# Patient Record
Sex: FEMALE | Race: WHITE | Employment: UNEMPLOYED | URBAN - METROPOLITAN AREA
[De-identification: names, ages, dates, MRNs, and addresses within clinical notes are randomized per-mention and may not be internally consistent; named-entity substitution may affect disease eponyms.]

---

## 2017-01-08 ENCOUNTER — OFFICE VISIT (OUTPATIENT)
Dept: FAMILY MEDICINE CLINIC | Facility: CLINIC | Age: 52
End: 2017-01-08

## 2017-01-08 VITALS
TEMPERATURE: 98 F | HEART RATE: 94 BPM | RESPIRATION RATE: 16 BRPM | SYSTOLIC BLOOD PRESSURE: 128 MMHG | BODY MASS INDEX: 25 KG/M2 | WEIGHT: 148 LBS | DIASTOLIC BLOOD PRESSURE: 72 MMHG | OXYGEN SATURATION: 97 %

## 2017-01-08 DIAGNOSIS — J01.10 ACUTE NON-RECURRENT FRONTAL SINUSITIS: Primary | ICD-10-CM

## 2017-01-08 PROCEDURE — 99213 OFFICE O/P EST LOW 20 MIN: CPT | Performed by: NURSE PRACTITIONER

## 2017-01-08 RX ORDER — PREDNISONE 20 MG/1
20 TABLET ORAL DAILY
Qty: 5 TABLET | Refills: 0 | Status: SHIPPED | OUTPATIENT
Start: 2017-01-08 | End: 2017-01-13

## 2017-01-08 RX ORDER — CEFDINIR 300 MG/1
300 CAPSULE ORAL 2 TIMES DAILY
Qty: 20 CAPSULE | Refills: 0 | Status: SHIPPED | OUTPATIENT
Start: 2017-01-08 | End: 2017-01-20

## 2017-01-08 NOTE — PROGRESS NOTES
CHIEF COMPLAINT:   Patient presents with:  Sinus Problem: for 2 weeks, s.s worsen on weds.  pt came back from [place] last night otc: otcs  Ear Pain: noth ears pain mostly right ear. headache and ear pressure, fever since thrus 100-99 otc: otcs      HPI:   Gi • Heart Disease Maternal Grandfather    • Stroke Neg    • Cancer Paternal Uncle    • Heart Disease Maternal Aunt         Smoking Status: Former Smoker                   Packs/Day: 0.00  Years:           Types: Cigarettes    Alcohol Use: Yes           0.5 o Educated on using supportive medication as needed  Educated on taking a probiotic or eating a yogurt daily while taking medication  Educated on s/s of worsening sx and when to seek higher level of care  Follow up with pcp if not improving  Meds & Refills f · Over-the-counter decongestants may be used unless a similar medicine was prescribed. Nasal sprays work the fastest. Use one that contains phenylephrine or oxymetazoline. First blow the nose gently. Then use the spray.  Do not use these medicines more ofte © 2476-6567 [address], [address] [place]. All rights reserved. This information is not intended as a substitute for professional medical care. Always follow your healthcare professional's instructions.               Kimberley Fleming

## 2017-01-20 ENCOUNTER — OFFICE VISIT (OUTPATIENT)
Dept: INTERNAL MEDICINE CLINIC | Facility: CLINIC | Age: 52
End: 2017-01-20

## 2017-01-20 VITALS
SYSTOLIC BLOOD PRESSURE: 124 MMHG | RESPIRATION RATE: 16 BRPM | HEIGHT: 65 IN | HEART RATE: 92 BPM | DIASTOLIC BLOOD PRESSURE: 82 MMHG | TEMPERATURE: 99 F | BODY MASS INDEX: 23.66 KG/M2 | WEIGHT: 142 LBS

## 2017-01-20 DIAGNOSIS — J01.10 ACUTE NON-RECURRENT FRONTAL SINUSITIS: Primary | ICD-10-CM

## 2017-01-20 PROCEDURE — 99213 OFFICE O/P EST LOW 20 MIN: CPT | Performed by: INTERNAL MEDICINE

## 2017-01-20 RX ORDER — CEFDINIR 300 MG/1
300 CAPSULE ORAL 2 TIMES DAILY
Qty: 20 CAPSULE | Refills: 0 | Status: SHIPPED | OUTPATIENT
Start: 2017-01-20 | End: 2017-01-30

## 2017-01-20 RX ORDER — CODEINE PHOSPHATE AND GUAIFENESIN 10; 100 MG/5ML; MG/5ML
10 SOLUTION ORAL EVERY 6 HOURS PRN
Qty: 180 ML | Refills: 0 | Status: SHIPPED | OUTPATIENT
Start: 2017-01-20 | End: 2017-01-30

## 2017-01-20 NOTE — PROGRESS NOTES
Shaniqua Trejo is a 45year old female. Patient presents with:  Sinus Problem: headache, nausea, and vomiting going on for 3 days . Pt just finished antibiotic for sinus infection.       HPI:     1/8 went to [facility] for sinus infection, just finished ABX but not hyperlipidemia[other] [OTHER] Mother    • Heart Disease Mother    • Heart Disease Maternal Grandfather    • Stroke Neg    • Cancer Paternal Uncle    • Heart Disease Maternal Aunt         Allergies    Pollen                    Allergy                     Co

## 2017-01-23 ENCOUNTER — TELEPHONE (OUTPATIENT)
Dept: INTERNAL MEDICINE CLINIC | Facility: CLINIC | Age: 52
End: 2017-01-23

## 2017-01-23 RX ORDER — FLUCONAZOLE 150 MG/1
150 TABLET ORAL ONCE
Qty: 1 TABLET | Refills: 0 | Status: SHIPPED | OUTPATIENT
Start: 2017-01-23 | End: 2017-01-23

## 2017-01-23 NOTE — TELEPHONE ENCOUNTER
Pt states she saw TB on 1/20/17, started Cefdinir on Friday, Saturday noticed she was starting yeast infection s/s, yellow discharge, itching, raw, painful. Last night, pt started a 3 day dosing of Monistat otc which helped her sleep.   Pt states her sinuse

## 2017-01-23 NOTE — TELEPHONE ENCOUNTER
.Called pt to advise info per TB that diflucan was sent in . Pt verbalized understanding, agreed with POC and had  no further questions.

## 2017-01-25 ENCOUNTER — TELEPHONE (OUTPATIENT)
Dept: INTERNAL MEDICINE CLINIC | Facility: CLINIC | Age: 52
End: 2017-01-25

## 2017-01-25 DIAGNOSIS — M79.10 MYALGIA: ICD-10-CM

## 2017-01-25 DIAGNOSIS — R68.83 CHILLS: Primary | ICD-10-CM

## 2017-01-25 RX ORDER — FLUCONAZOLE 150 MG/1
TABLET ORAL
Qty: 2 TABLET | Refills: 0 | Status: SHIPPED | OUTPATIENT
Start: 2017-01-25 | End: 2017-09-22 | Stop reason: ALTCHOICE

## 2017-01-25 NOTE — TELEPHONE ENCOUNTER
LOV 1/20/17  Pt stating sx worsening- pain when going to [facility]. Burning upon urination. Itching. Yellow D/C. Diflucan helped for about 24hrs. New sx of fatigue, sore throat, body aches and chills. No fever at this time. Please advise. Thank you.

## 2017-01-26 ENCOUNTER — LAB ENCOUNTER (OUTPATIENT)
Dept: LAB | Facility: HOSPITAL | Age: 52
End: 2017-01-26
Attending: INTERNAL MEDICINE
Payer: COMMERCIAL

## 2017-01-26 DIAGNOSIS — M79.10 MYALGIA: ICD-10-CM

## 2017-01-26 DIAGNOSIS — R68.83 CHILLS: ICD-10-CM

## 2017-01-26 PROCEDURE — 87999 UNLISTED MICROBIOLOGY PX: CPT

## 2017-01-26 PROCEDURE — 87798 DETECT AGENT NOS DNA AMP: CPT

## 2017-01-26 PROCEDURE — 87502 INFLUENZA DNA AMP PROBE: CPT

## 2017-01-26 NOTE — TELEPHONE ENCOUNTER
Patient notified TB would like her to have an influenza nasal swab to determine if she has the flu, order in [facility].   Pt notified for yeast take Diflucan 150mg now and in 72 hours, escribed to local pharmacy and at the same time pt can use otc Mon

## 2017-01-26 NOTE — TELEPHONE ENCOUNTER
Wondering if she is now getting the flu with aches, chills, sore throat  Can have her to flu swab at the hospital- I will order  For yeast, let us try diflucan 150mg times one followed by another 150mg in 72 hours.  At same time, she can apply  otc monistat

## 2017-01-27 NOTE — TELEPHONE ENCOUNTER
Lm for pt to inform, per TB, OV next week would be good. To call back at the office to schedule or if any further questions.

## 2017-01-27 NOTE — TELEPHONE ENCOUNTER
Pt notified Infuenza nasal swab is negative. Pt states she has head and nasal congestion with yellow mucus, PND, chills, body aches, still taking Cefdinir but doesn't think the med is working, also taking Tylenol Sinus without success.   Pt states she joaquin

## 2017-09-22 ENCOUNTER — OFFICE VISIT (OUTPATIENT)
Dept: INTERNAL MEDICINE CLINIC | Facility: CLINIC | Age: 52
End: 2017-09-22

## 2017-09-22 ENCOUNTER — LAB ENCOUNTER (OUTPATIENT)
Dept: LAB | Age: 52
End: 2017-09-22
Attending: INTERNAL MEDICINE
Payer: COMMERCIAL

## 2017-09-22 VITALS
DIASTOLIC BLOOD PRESSURE: 72 MMHG | RESPIRATION RATE: 16 BRPM | WEIGHT: 142 LBS | HEART RATE: 88 BPM | TEMPERATURE: 99 F | HEIGHT: 65 IN | BODY MASS INDEX: 23.66 KG/M2 | SYSTOLIC BLOOD PRESSURE: 124 MMHG

## 2017-09-22 DIAGNOSIS — Z91.09 ENVIRONMENTAL ALLERGIES: ICD-10-CM

## 2017-09-22 DIAGNOSIS — M22.41 PATELLOFEMORAL CHONDROSIS OF RIGHT KNEE: ICD-10-CM

## 2017-09-22 DIAGNOSIS — Z01.818 PRE-OP EXAMINATION: Primary | ICD-10-CM

## 2017-09-22 DIAGNOSIS — Z01.818 PRE-OP EXAMINATION: ICD-10-CM

## 2017-09-22 LAB
BASOPHILS # BLD AUTO: 0.06 X10(3) UL (ref 0–0.1)
BASOPHILS NFR BLD AUTO: 0.8 %
BUN BLD-MCNC: 11 MG/DL (ref 8–20)
CALCIUM BLD-MCNC: 8.9 MG/DL (ref 8.3–10.3)
CHLORIDE: 105 MMOL/L (ref 101–111)
CO2: 28 MMOL/L (ref 22–32)
CREAT BLD-MCNC: 0.85 MG/DL (ref 0.55–1.02)
EOSINOPHIL # BLD AUTO: 0.22 X10(3) UL (ref 0–0.3)
EOSINOPHIL NFR BLD AUTO: 2.8 %
ERYTHROCYTE [DISTWIDTH] IN BLOOD BY AUTOMATED COUNT: 13.2 % (ref 11.5–16)
GLUCOSE BLD-MCNC: 86 MG/DL (ref 70–99)
HCT VFR BLD AUTO: 39.4 % (ref 34–50)
HGB BLD-MCNC: 13.1 G/DL (ref 12–16)
IMMATURE GRANULOCYTE COUNT: 0.02 X10(3) UL (ref 0–1)
IMMATURE GRANULOCYTE RATIO %: 0.3 %
LYMPHOCYTES # BLD AUTO: 2.12 X10(3) UL (ref 0.9–4)
LYMPHOCYTES NFR BLD AUTO: 26.9 %
MCH RBC QN AUTO: 31.4 PG (ref 27–33.2)
MCHC RBC AUTO-ENTMCNC: 33.2 G/DL (ref 31–37)
MCV RBC AUTO: 94.5 FL (ref 81–100)
MONOCYTES # BLD AUTO: 0.76 X10(3) UL (ref 0.1–0.6)
MONOCYTES NFR BLD AUTO: 9.6 %
NEUTROPHIL ABS PRELIM: 4.7 X10 (3) UL (ref 1.3–6.7)
NEUTROPHILS # BLD AUTO: 4.7 X10(3) UL (ref 1.3–6.7)
NEUTROPHILS NFR BLD AUTO: 59.6 %
PLATELET # BLD AUTO: 322 10(3)UL (ref 150–450)
POTASSIUM SERPL-SCNC: 4.5 MMOL/L (ref 3.6–5.1)
RBC # BLD AUTO: 4.17 X10(6)UL (ref 3.8–5.1)
RED CELL DISTRIBUTION WIDTH-SD: 45.6 FL (ref 35.1–46.3)
SODIUM SERPL-SCNC: 139 MMOL/L (ref 136–144)
WBC # BLD AUTO: 7.9 X10(3) UL (ref 4–13)

## 2017-09-22 PROCEDURE — 85025 COMPLETE CBC W/AUTO DIFF WBC: CPT | Performed by: INTERNAL MEDICINE

## 2017-09-22 PROCEDURE — 80048 BASIC METABOLIC PNL TOTAL CA: CPT | Performed by: INTERNAL MEDICINE

## 2017-09-22 PROCEDURE — 99243 OFF/OP CNSLTJ NEW/EST LOW 30: CPT | Performed by: INTERNAL MEDICINE

## 2017-09-22 PROCEDURE — 93000 ELECTROCARDIOGRAM COMPLETE: CPT | Performed by: INTERNAL MEDICINE

## 2017-09-22 NOTE — PROGRESS NOTES
Severiano Foyer is a 47year old female.   Patient presents with:  Pre-Op Exam: For R knee surgery      HPI:     Patient with history of right knee chondrosis, environmental allergies,  history of migraines here for pre op exam for right knee surgery on 9/26/1 Comment:Environmental      REVIEW OF SYSTEMS:   GENERAL HEALTH:  no fevers or chills  SKIN: denies any unusual skin lesions or rashes  RESPIRATORY: no cough  CARDIOVASCULAR: denies chest pain   GI: denies abdominal pain   : no dysuria  NEURO: denies

## 2017-09-25 ENCOUNTER — TELEPHONE (OUTPATIENT)
Dept: INTERNAL MEDICINE CLINIC | Facility: CLINIC | Age: 52
End: 2017-09-25

## 2017-09-25 NOTE — TELEPHONE ENCOUNTER
Faxed Preoperative paperwork and lab results to Rory Gentile at [address] as requested. Sent original EKG to scan, copy kept in triage file. Confirmation received.

## 2017-09-25 NOTE — TELEPHONE ENCOUNTER
Pt was seen 9/22/17 by TB stated they have not received any of the pre op paperwork. Please fax to her attention.

## 2017-12-29 ENCOUNTER — OFFICE VISIT (OUTPATIENT)
Dept: INTERNAL MEDICINE CLINIC | Facility: CLINIC | Age: 52
End: 2017-12-29

## 2017-12-29 VITALS
HEART RATE: 88 BPM | HEIGHT: 65 IN | RESPIRATION RATE: 16 BRPM | TEMPERATURE: 99 F | BODY MASS INDEX: 25.16 KG/M2 | DIASTOLIC BLOOD PRESSURE: 80 MMHG | SYSTOLIC BLOOD PRESSURE: 121 MMHG | WEIGHT: 151 LBS

## 2017-12-29 DIAGNOSIS — K42.9 UMBILICAL HERNIA WITHOUT OBSTRUCTION AND WITHOUT GANGRENE: ICD-10-CM

## 2017-12-29 DIAGNOSIS — J01.01 ACUTE RECURRENT MAXILLARY SINUSITIS: Primary | ICD-10-CM

## 2017-12-29 PROCEDURE — 99213 OFFICE O/P EST LOW 20 MIN: CPT | Performed by: INTERNAL MEDICINE

## 2017-12-29 RX ORDER — AMOXICILLIN AND CLAVULANATE POTASSIUM 875; 125 MG/1; MG/1
1 TABLET, FILM COATED ORAL 2 TIMES DAILY
Qty: 20 TABLET | Refills: 0 | Status: SHIPPED | OUTPATIENT
Start: 2017-12-29 | End: 2018-01-08

## 2017-12-29 RX ORDER — TRIAMCINOLONE ACETONIDE 55 UG/1
1 SPRAY, METERED NASAL DAILY
COMMUNITY
End: 2021-02-22 | Stop reason: ALTCHOICE

## 2017-12-29 RX ORDER — METHYLPREDNISOLONE 4 MG/1
TABLET ORAL
Qty: 1 KIT | Refills: 0 | Status: SHIPPED | OUTPATIENT
Start: 2017-12-29 | End: 2018-01-04

## 2018-01-03 ENCOUNTER — TELEPHONE (OUTPATIENT)
Dept: INTERNAL MEDICINE CLINIC | Facility: CLINIC | Age: 53
End: 2018-01-03

## 2018-01-04 ENCOUNTER — OFFICE VISIT (OUTPATIENT)
Dept: INTERNAL MEDICINE CLINIC | Facility: CLINIC | Age: 53
End: 2018-01-04

## 2018-01-04 VITALS
HEIGHT: 65 IN | WEIGHT: 147 LBS | HEART RATE: 103 BPM | RESPIRATION RATE: 16 BRPM | SYSTOLIC BLOOD PRESSURE: 112 MMHG | BODY MASS INDEX: 24.49 KG/M2 | DIASTOLIC BLOOD PRESSURE: 78 MMHG | TEMPERATURE: 99 F

## 2018-01-04 DIAGNOSIS — J01.01 ACUTE RECURRENT MAXILLARY SINUSITIS: Primary | ICD-10-CM

## 2018-01-04 PROCEDURE — 99213 OFFICE O/P EST LOW 20 MIN: CPT | Performed by: INTERNAL MEDICINE

## 2018-01-04 RX ORDER — METHYLPREDNISOLONE 4 MG/1
TABLET ORAL
Qty: 1 KIT | Refills: 0 | Status: SHIPPED | OUTPATIENT
Start: 2018-01-04 | End: 2018-02-05

## 2018-01-04 NOTE — TELEPHONE ENCOUNTER
Called pt to inform, per TB, should be taking the medrol dose hu and needs to complete full course pf ABX too before we call it treatment failure  If not better after 10 days, then we will need to be seen again.  Pt stating already previously given zpack w

## 2018-01-04 NOTE — PROGRESS NOTES
Cristofer Forbes is a 47year old female. Patient presents with:  Sinus Problem: was here last friday for sinus problems and nothing has changed      HPI:     Patient here last week with sinus pressure, pain, low grade fever and throat discomfort.  Given augme Problem Relation Age of Onset   • acute MI[other] [OTHER] Father    • hyperlipidemia[other] [OTHER] Father    • Heart Disease Father    • hyperlipidemia[other] [OTHER] Mother    • Heart Disease Mother    • Heart Disease Maternal Grandfather    • Stroke N

## 2018-01-04 NOTE — TELEPHONE ENCOUNTER
She should be taking the medrol dose hu, needs to complete full course pf ABX too before we call it treatment failure  If not better after 10 days, then we will need to see her again

## 2018-01-04 NOTE — TELEPHONE ENCOUNTER
Pt is asking for a nurse to call her back, Pt had appt 12/29/17, for ear pain, still has clogged sinuses, ears still feel heavy, has post nasal drip, causing chest heaviness, not really a cough.  Was prescribed Augmentin, does not feel it has done anything

## 2018-01-04 NOTE — TELEPHONE ENCOUNTER
Per TB OV notes :  Acute recurrent maxillary sinusitis  (primary encounter diagnosis)- augmentin, medrol dose hu if symptoms not better on ABX in 3-5 days, nasal saline, fluids    Pt stating has tried several nasal rinses with no improvement.  Pushing flui

## 2018-02-05 ENCOUNTER — OFFICE VISIT (OUTPATIENT)
Dept: OBGYN CLINIC | Facility: CLINIC | Age: 53
End: 2018-02-05

## 2018-02-05 VITALS
HEIGHT: 65 IN | DIASTOLIC BLOOD PRESSURE: 68 MMHG | WEIGHT: 150 LBS | SYSTOLIC BLOOD PRESSURE: 112 MMHG | HEART RATE: 72 BPM | BODY MASS INDEX: 24.99 KG/M2

## 2018-02-05 DIAGNOSIS — Z01.419 ENCOUNTER FOR WELL WOMAN EXAM WITH ROUTINE GYNECOLOGICAL EXAM: Primary | ICD-10-CM

## 2018-02-05 DIAGNOSIS — Z12.39 BREAST CANCER SCREENING: ICD-10-CM

## 2018-02-05 PROCEDURE — 99396 PREV VISIT EST AGE 40-64: CPT | Performed by: OBSTETRICS & GYNECOLOGY

## 2018-02-05 PROCEDURE — 88175 CYTOPATH C/V AUTO FLUID REDO: CPT | Performed by: OBSTETRICS & GYNECOLOGY

## 2018-02-05 PROCEDURE — 87624 HPV HI-RISK TYP POOLED RSLT: CPT | Performed by: OBSTETRICS & GYNECOLOGY

## 2018-02-05 RX ORDER — GARLIC EXTRACT 500 MG
1 CAPSULE ORAL DAILY
COMMUNITY

## 2018-02-05 NOTE — PROGRESS NOTES
Tamela Da Silva is a 47year old female [id number] Patient's last menstrual period was 2017 (approximate). Patient presents with:  Wellness Visit  .   No complaints  OBSTETRICS HISTORY:  OB History    Para Term  AB Living   4 2 2   2 2   SAB T • hyperlipidemia [OTHER] Mother    • Heart Disease Maternal Grandfather    • Cancer Paternal Uncle      lung   • Heart Disease Maternal Aunt    • Stroke Neg        MEDICATIONS:    Current Outpatient Prescriptions:   •  Acidophilus/Pectin Oral Cap, Take 1 affect    Pelvic Exam:  External Genitalia: normal appearance, hair distribution, and no lesions  Urethral Meatus:  normal in size, location, without lesions and prolapse  Bladder:  No fullness, masses or tenderness  Vagina:  Normal appearance without lesi

## 2018-02-06 LAB — HPV I/H RISK 1 DNA SPEC QL NAA+PROBE: NEGATIVE

## 2018-03-14 ENCOUNTER — OFFICE VISIT (OUTPATIENT)
Dept: INTERNAL MEDICINE CLINIC | Facility: CLINIC | Age: 53
End: 2018-03-14

## 2018-03-14 VITALS
DIASTOLIC BLOOD PRESSURE: 70 MMHG | SYSTOLIC BLOOD PRESSURE: 122 MMHG | TEMPERATURE: 99 F | HEIGHT: 66 IN | WEIGHT: 149 LBS | BODY MASS INDEX: 23.95 KG/M2 | RESPIRATION RATE: 16 BRPM | HEART RATE: 68 BPM

## 2018-03-14 DIAGNOSIS — J01.10 ACUTE NON-RECURRENT FRONTAL SINUSITIS: Primary | ICD-10-CM

## 2018-03-14 PROCEDURE — 99213 OFFICE O/P EST LOW 20 MIN: CPT | Performed by: PHYSICIAN ASSISTANT

## 2018-03-14 RX ORDER — AMOXICILLIN AND CLAVULANATE POTASSIUM 875; 125 MG/1; MG/1
1 TABLET, FILM COATED ORAL 2 TIMES DAILY
Qty: 28 TABLET | Refills: 0 | Status: SHIPPED | OUTPATIENT
Start: 2018-03-14 | End: 2018-03-28

## 2018-03-14 NOTE — PROGRESS NOTES
HPI:   Griselda Blanco is a 47year old female who presents for upper respiratory symptoms for  3  weeks. Patient reports sore throat, ear pain, sinus pain, OTC cold meds have not been helping, prior history of sinusitis, denies fever.   Consistently takes n Years: 0.00         Types: Cigarettes     Quit date: 12/29/1987  Smokeless tobacco: Never Used                      Alcohol use: Yes           0.6 oz/week     Standard drinks or equivalent: 1 per week     Comment: Cage 3/14/2018        REVIEW OF SYSTEMS:

## 2018-04-02 ENCOUNTER — TELEPHONE (OUTPATIENT)
Dept: INTERNAL MEDICINE CLINIC | Facility: CLINIC | Age: 53
End: 2018-04-02

## 2018-04-02 RX ORDER — FLUCONAZOLE 150 MG/1
150 TABLET ORAL ONCE
Qty: 1 TABLET | Refills: 0 | Status: SHIPPED | OUTPATIENT
Start: 2018-04-02 | End: 2018-04-02

## 2018-04-02 NOTE — TELEPHONE ENCOUNTER
To on call ,   Called patient, stating discomfort, itching, yellow discharge, burning, red in vaginal area for [id number]. NO other symptoms at this time. Was recently prescribed Augmentin for URI on 3/14/18.  She tried OTC generic Monistat x7days without

## 2018-04-02 NOTE — TELEPHONE ENCOUNTER
Pt saw Michael Lee on 3/14 for uri and was given meds-she then was out of country in [facility] and got a yeast infection-tried OTC but nothing helped-she has had it now for 9 days and would like diflucan called into her local pharm if possible

## 2018-06-19 ENCOUNTER — OFFICE VISIT (OUTPATIENT)
Dept: SURGERY | Facility: CLINIC | Age: 53
End: 2018-06-19

## 2018-06-19 VITALS
SYSTOLIC BLOOD PRESSURE: 135 MMHG | WEIGHT: 149 LBS | BODY MASS INDEX: 24.83 KG/M2 | HEART RATE: 97 BPM | DIASTOLIC BLOOD PRESSURE: 80 MMHG | HEIGHT: 65 IN

## 2018-06-19 DIAGNOSIS — K42.9 UMBILICAL HERNIA WITHOUT OBSTRUCTION AND WITHOUT GANGRENE: Primary | ICD-10-CM

## 2018-06-19 PROCEDURE — 99243 OFF/OP CNSLTJ NEW/EST LOW 30: CPT | Performed by: SURGERY

## 2018-06-19 NOTE — H&P
New Patient Visit Note       Active Problems      1. Umbilical hernia without obstruction and without gangrene        Chief Complaint   Patient presents with:  Hernia: New patient referred by Dr. Hickman Root umbilical hernia. c/o discomfort with excercise. Social History Main Topics    Smoking status: Former Smoker                                                                Packs/day: 0.00      Years: 0.00           Types: Cigarettes       Quit date: 12/29/1987    Smokeless tobacco: Never Used 65\"   Wt 149 lb   BMI 24.79 kg/m²   Physical Exam   Constitutional: She appears well-developed and well-nourished. No distress. Cardiovascular: Normal rate, regular rhythm and normal heart sounds.     Pulmonary/Chest: Effort normal and breath sounds norm

## 2018-06-22 ENCOUNTER — TELEPHONE (OUTPATIENT)
Dept: SURGERY | Facility: CLINIC | Age: 53
End: 2018-06-22

## 2018-06-22 RX ORDER — PROCHLORPERAZINE 25 MG
25 SUPPOSITORY, RECTAL RECTAL EVERY 12 HOURS PRN
Qty: 24 SUPPOSITORY | Refills: 0 | Status: SHIPPED | OUTPATIENT
Start: 2018-06-22 | End: 2019-03-05

## 2018-06-22 NOTE — TELEPHONE ENCOUNTER
Pt called to say that she has persistent nausea since being home from surgery today. She is requesting Compazine suppositories. A prescription will be placed via [facility].

## 2018-07-09 ENCOUNTER — OFFICE VISIT (OUTPATIENT)
Dept: SURGERY | Facility: CLINIC | Age: 53
End: 2018-07-09

## 2018-07-09 VITALS
TEMPERATURE: 98 F | SYSTOLIC BLOOD PRESSURE: 127 MMHG | DIASTOLIC BLOOD PRESSURE: 82 MMHG | WEIGHT: 149 LBS | BODY MASS INDEX: 24.83 KG/M2 | HEART RATE: 82 BPM | HEIGHT: 65 IN

## 2018-07-09 DIAGNOSIS — K42.9 UMBILICAL HERNIA WITHOUT OBSTRUCTION AND WITHOUT GANGRENE: Primary | ICD-10-CM

## 2018-07-09 PROCEDURE — 99024 POSTOP FOLLOW-UP VISIT: CPT | Performed by: SURGERY

## 2018-07-09 NOTE — PROGRESS NOTES
Post Operative Visit Note       Active Problems  1.  Umbilical hernia without obstruction and without gangrene         Chief Complaint   Patient presents with:  Post-Op: 7/12 Umbilical hernia without obstruction and without gangrene done at [facility], per pt yaniv Types: Cigarettes       Quit date: 12/29/1987    Smokeless tobacco: Never Used                        Alcohol use: Yes           0.6 oz/week       Standard drinks or equivalent: 1 per week       Comment: Cage 3/14/2018    Drug use: No            O 82   Temp 98.2 °F (36.8 °C) (Oral)   Ht 65\"   Wt 149 lb   BMI 24.79 kg/m²   Physical Exam   Constitutional: She appears well-developed and well-nourished. No distress. Abdominal: Soft. She exhibits no distension. There is no tenderness.  There is no rebo

## 2019-03-05 ENCOUNTER — OFFICE VISIT (OUTPATIENT)
Dept: OBGYN CLINIC | Facility: CLINIC | Age: 54
End: 2019-03-05
Payer: COMMERCIAL

## 2019-03-05 VITALS
HEART RATE: 94 BPM | DIASTOLIC BLOOD PRESSURE: 80 MMHG | HEIGHT: 65 IN | BODY MASS INDEX: 26.1 KG/M2 | WEIGHT: 156.63 LBS | SYSTOLIC BLOOD PRESSURE: 130 MMHG

## 2019-03-05 DIAGNOSIS — N81.6 RECTOCELE: Primary | ICD-10-CM

## 2019-03-05 PROCEDURE — 99213 OFFICE O/P EST LOW 20 MIN: CPT | Performed by: OBSTETRICS & GYNECOLOGY

## 2019-03-05 NOTE — PROGRESS NOTES
She complains of pressure this morning and felt like she had a very large bulge in her vaginal area. She has minimal urinary urgency and minimal stress urinary nodes. She does have some constipation but empties her bowels well.   Physical exam shows a mod

## 2019-03-19 ENCOUNTER — OFFICE VISIT (OUTPATIENT)
Dept: OBGYN CLINIC | Facility: CLINIC | Age: 54
End: 2019-03-19
Payer: COMMERCIAL

## 2019-03-19 VITALS
WEIGHT: 156.38 LBS | HEART RATE: 97 BPM | SYSTOLIC BLOOD PRESSURE: 110 MMHG | HEIGHT: 65 IN | DIASTOLIC BLOOD PRESSURE: 80 MMHG | BODY MASS INDEX: 26.05 KG/M2

## 2019-03-19 DIAGNOSIS — Z12.39 BREAST CANCER SCREENING: ICD-10-CM

## 2019-03-19 DIAGNOSIS — Z12.4 CERVICAL CANCER SCREENING: ICD-10-CM

## 2019-03-19 DIAGNOSIS — Z01.419 ENCOUNTER FOR WELL WOMAN EXAM WITH ROUTINE GYNECOLOGICAL EXAM: Primary | ICD-10-CM

## 2019-03-19 PROCEDURE — 88175 CYTOPATH C/V AUTO FLUID REDO: CPT | Performed by: OBSTETRICS & GYNECOLOGY

## 2019-03-19 PROCEDURE — 87624 HPV HI-RISK TYP POOLED RSLT: CPT | Performed by: OBSTETRICS & GYNECOLOGY

## 2019-03-19 PROCEDURE — 99396 PREV VISIT EST AGE 40-64: CPT | Performed by: OBSTETRICS & GYNECOLOGY

## 2019-03-19 RX ORDER — ESCITALOPRAM OXALATE 5 MG/1
5 TABLET ORAL DAILY
Qty: 30 TABLET | Refills: 0 | Status: SHIPPED | OUTPATIENT
Start: 2019-03-19 | End: 2019-08-06 | Stop reason: CLARIF

## 2019-03-19 NOTE — PROGRESS NOTES
Juan Boss is a 46year old female [id number] Patient's last menstrual period was 10/03/2018 (approximate). Patient presents with:  Wellness Visit  .   Patient c/o hot flashes and irritability    OBSTETRICS HISTORY:  OB History    Para Term  AB 1987        Years since quittin.2      Smokeless tobacco: Never Used    Substance and Sexual Activity      Alcohol use:  Yes        Alcohol/week: 0.6 oz        Types: 1 Standard drinks or equivalent per week        Comment: Cage 3/14/2018       mouth daily. , Disp: 30 tablet, Rfl: 0  •  Acidophilus/Pectin Oral Cap, Take 1 capsule by mouth daily. , Disp: , Rfl:   •  Multiple Vitamins-Minerals (MULTIVITAMIN ADULT OR), Take by mouth., Disp: , Rfl:   •  Triamcinolone Acetonide 55 MCG/ACT Nasal Aerosol, prolapse  Bladder:  No fullness, masses or tenderness  Vagina:  Normal appearance without lesions, no abnormal discharge  Cervix:  Normal without tenderness on motion  Uterus: normal in size, contour, position, mobility, without tenderness  Adnexa: normal

## 2019-03-20 ENCOUNTER — OFFICE VISIT (OUTPATIENT)
Dept: UROLOGY | Facility: HOSPITAL | Age: 54
End: 2019-03-20
Attending: OBSTETRICS & GYNECOLOGY
Payer: COMMERCIAL

## 2019-03-20 VITALS
SYSTOLIC BLOOD PRESSURE: 138 MMHG | WEIGHT: 156 LBS | BODY MASS INDEX: 25.99 KG/M2 | HEIGHT: 65 IN | DIASTOLIC BLOOD PRESSURE: 88 MMHG

## 2019-03-20 DIAGNOSIS — N39.3 SUI (STRESS URINARY INCONTINENCE, FEMALE): ICD-10-CM

## 2019-03-20 DIAGNOSIS — N81.2 CYSTOCELE AND RECTOCELE WITH INCOMPLETE UTEROVAGINAL PROLAPSE: ICD-10-CM

## 2019-03-20 DIAGNOSIS — N81.2 INCOMPLETE UTEROVAGINAL PROLAPSE: Primary | ICD-10-CM

## 2019-03-20 LAB — HPV I/H RISK 1 DNA SPEC QL NAA+PROBE: NEGATIVE

## 2019-03-20 PROCEDURE — 99201 HC OUTPT EVAL AND MGNT NEW PT LEVEL 1: CPT

## 2019-03-20 NOTE — PROGRESS NOTES
ID: Elvira Gutierrez  : 1965  Date: 3/20/2019     Referred by Dr. John Mcfarland MD    Patient presents with:  Prolapse  Incontinence      HPI:  The patient is a 46year-old female,  (vaginal deliveries), who presents for evaluation of vaginal bulge. Procedure Laterality Date   • BACK SURGERY      cervical surg fusion c6-c7   • COLONOSCOPY  12/5/16    [facility]   • KNEE ARTHROSCOPY Right 09/2047    cartilage transplant   • LEG/ANKLE SURGERY PROC UNLISTED      ankle exostectomy fibula   • OTHER  1/1/90 is understandable. No acute distress. HEAD: Normocephalic and atraumatic with normal hair distribution  NECK: Symmetrical and supple; trachea is midline; thyroid is smooth, not enlarged, and without nodules. No lymphadenopathy or masses palpated.    [facility] floor physical therapy    Treatment Plan, Surgical:   I utilized online animation to explained exam findings. The patient has stage 2 rectocele, cystocele and associated uterine prolapse.   I explained the goals of pelvic floor PT and if she is not complete

## 2019-04-02 ENCOUNTER — LAB ENCOUNTER (OUTPATIENT)
Dept: LAB | Age: 54
End: 2019-04-02
Attending: OBSTETRICS & GYNECOLOGY
Payer: COMMERCIAL

## 2019-04-02 ENCOUNTER — OFFICE VISIT (OUTPATIENT)
Dept: PHYSICAL THERAPY | Age: 54
End: 2019-04-02
Attending: OBSTETRICS & GYNECOLOGY
Payer: COMMERCIAL

## 2019-04-02 DIAGNOSIS — N81.2 CYSTOCELE AND RECTOCELE WITH INCOMPLETE UTEROVAGINAL PROLAPSE: ICD-10-CM

## 2019-04-02 DIAGNOSIS — Z01.419 ENCOUNTER FOR WELL WOMAN EXAM WITH ROUTINE GYNECOLOGICAL EXAM: ICD-10-CM

## 2019-04-02 DIAGNOSIS — N81.2 INCOMPLETE UTEROVAGINAL PROLAPSE: ICD-10-CM

## 2019-04-02 PROCEDURE — 97161 PT EVAL LOW COMPLEX 20 MIN: CPT

## 2019-04-02 PROCEDURE — 80061 LIPID PANEL: CPT | Performed by: OBSTETRICS & GYNECOLOGY

## 2019-04-02 PROCEDURE — 82306 VITAMIN D 25 HYDROXY: CPT | Performed by: OBSTETRICS & GYNECOLOGY

## 2019-04-02 PROCEDURE — 80050 GENERAL HEALTH PANEL: CPT | Performed by: OBSTETRICS & GYNECOLOGY

## 2019-04-02 NOTE — PROGRESS NOTES
MUSCULOSKELETAL AND PELVIC FLOOR EVALUATION:   Referring Physician: Dr. Sinai Hyde  Diagnosis:  Cystocele, rectocele, uterine prolapse with incontinence Date of Service: 4/2/2019     PATIENT SUMMARY   Mirlande Alvarado is a 46year old y/o female who presents to Normal     External Palpation: normal   Abdominal Wall Integrity:  Scar noted from recent hernia repair.    ROM Summary: grossly WNL (B) LE except: normal   Hip  Flexion: R 120; L 120  Flexibility Summary: WNL except:  Normal   Strength Summary: lower abdom region in 8 visits    Frequency / Duration: Patient will be seen for 1 x/week or a total of 8 visits over a 90 day period. Treatment will include: Neuromuscular Re-education; Therapeutic Exercises; Manual Therapy;  Electrical Stim;      Education or treatm

## 2019-04-04 ENCOUNTER — OFFICE VISIT (OUTPATIENT)
Dept: PHYSICAL THERAPY | Age: 54
End: 2019-04-04
Attending: OBSTETRICS & GYNECOLOGY
Payer: COMMERCIAL

## 2019-04-04 PROCEDURE — 97110 THERAPEUTIC EXERCISES: CPT

## 2019-04-04 PROCEDURE — 97112 NEUROMUSCULAR REEDUCATION: CPT

## 2019-04-04 NOTE — PROGRESS NOTES
Patient aware of results and recommendation to follow a low cholesterol diet. . Patient verbalizes understanding.
Dehydration

## 2019-04-04 NOTE — PROGRESS NOTES
Dx:  Urinary incontinence and cystocele/rectocyele and uterine prolapse      Authorized # of Visits:  10         Next MD visit: none scheduled  Fall Risk: standard         Precautions: n/a             Subjective:  Pt reports attempting the two exercises, b

## 2019-04-10 ENCOUNTER — OFFICE VISIT (OUTPATIENT)
Dept: PHYSICAL THERAPY | Age: 54
End: 2019-04-10
Attending: OBSTETRICS & GYNECOLOGY
Payer: COMMERCIAL

## 2019-04-10 PROCEDURE — 97110 THERAPEUTIC EXERCISES: CPT

## 2019-04-10 PROCEDURE — 97112 NEUROMUSCULAR REEDUCATION: CPT

## 2019-04-10 NOTE — PROGRESS NOTES
Dx:  Urinary incontinence and cystocele/rectocyele and uterine prolapse      Authorized # of Visits:  10         Next MD visit: none scheduled  Fall Risk: standard         Precautions: n/a             Subjective:  Pt has been compliant with her HEP.  Maritza Christine

## 2019-04-17 ENCOUNTER — OFFICE VISIT (OUTPATIENT)
Dept: PHYSICAL THERAPY | Age: 54
End: 2019-04-17
Attending: OBSTETRICS & GYNECOLOGY
Payer: COMMERCIAL

## 2019-04-17 PROCEDURE — 97110 THERAPEUTIC EXERCISES: CPT

## 2019-04-17 PROCEDURE — 97112 NEUROMUSCULAR REEDUCATION: CPT

## 2019-04-17 NOTE — PROGRESS NOTES
Dx:  Urinary incontinence and cystocele/rectocyele and uterine prolapse      Authorized # of Visits:  10         Next MD visit: none scheduled  Fall Risk: standard         Precautions: n/a             Subjective:  Pt has been compliant with her HEP.  Frederick Frames 45 min  Total Treatment Time: 45 min

## 2019-04-24 ENCOUNTER — APPOINTMENT (OUTPATIENT)
Dept: PHYSICAL THERAPY | Age: 54
End: 2019-04-24
Attending: OBSTETRICS & GYNECOLOGY
Payer: COMMERCIAL

## 2019-04-24 PROCEDURE — 97112 NEUROMUSCULAR REEDUCATION: CPT

## 2019-04-24 PROCEDURE — 97110 THERAPEUTIC EXERCISES: CPT

## 2019-04-24 NOTE — PROGRESS NOTES
Dx:  Urinary incontinence and cystocele/rectocyele and uterine prolapse      Authorized # of Visits:  10         Next MD visit: none scheduled  Fall Risk: standard         Precautions: n/a             Subjective:    Pt was not able to be as compliant with with PF contractions 10 x   Reformer 10 x          Bird dog 10 x                                  Skilled Services:  Skilled exercises with manual PT     Charges:  Neuro 2 EX 1      Total Timed Treatment: 45 min  Total Treatment Time: 45 min

## 2019-05-01 ENCOUNTER — OFFICE VISIT (OUTPATIENT)
Dept: PHYSICAL THERAPY | Age: 54
End: 2019-05-01
Attending: OBSTETRICS & GYNECOLOGY
Payer: COMMERCIAL

## 2019-05-01 PROCEDURE — 97110 THERAPEUTIC EXERCISES: CPT

## 2019-05-01 PROCEDURE — 97112 NEUROMUSCULAR REEDUCATION: CPT

## 2019-05-01 NOTE — PROGRESS NOTES
Dx:  Urinary incontinence and cystocele/rectocyele and uterine prolapse      Authorized # of Visits:  10         Next MD visit: none scheduled  Fall Risk: standard         Precautions: n/a             Subjective:    Pt was not able to be as compliant with Ball on wall hip abd green band 10 x      Clams 10 x green band  Bird dog 10 x   Ball over head 10 x  Lateral side walking green band 2 to the right and 1 to the L  Lateral side walking   Forward walking green band       carlos LV 1 90/90   10 x  Reformer

## 2019-05-08 ENCOUNTER — OFFICE VISIT (OUTPATIENT)
Dept: PHYSICAL THERAPY | Age: 54
End: 2019-05-08
Attending: OBSTETRICS & GYNECOLOGY
Payer: COMMERCIAL

## 2019-05-08 PROCEDURE — 97110 THERAPEUTIC EXERCISES: CPT

## 2019-05-08 PROCEDURE — 97112 NEUROMUSCULAR REEDUCATION: CPT

## 2019-05-08 NOTE — PROGRESS NOTES
Dx:  Urinary incontinence and cystocele/rectocyele and uterine prolapse      Authorized # of Visits:  10         Next MD visit: none scheduled  Fall Risk: standard         Precautions: n/a             Subjective:   Pt better with HEP.  Less leakage noted wi Bird dog 10 x   Ball over head 10 x  Lateral side walking green band 2 to the right and 1 to the L  Lateral side walking   Forward walking green band   Coccygeal taping 5 mins     carlos LV 1 90/90   10 x  Reformer squats with PF contractions 10 x   Refor

## 2019-05-15 ENCOUNTER — APPOINTMENT (OUTPATIENT)
Dept: PHYSICAL THERAPY | Age: 54
End: 2019-05-15
Attending: OBSTETRICS & GYNECOLOGY
Payer: COMMERCIAL

## 2019-05-22 ENCOUNTER — OFFICE VISIT (OUTPATIENT)
Dept: PHYSICAL THERAPY | Age: 54
End: 2019-05-22
Attending: OBSTETRICS & GYNECOLOGY
Payer: COMMERCIAL

## 2019-05-22 PROCEDURE — 97112 NEUROMUSCULAR REEDUCATION: CPT

## 2019-05-22 PROCEDURE — 97110 THERAPEUTIC EXERCISES: CPT

## 2019-05-22 NOTE — PROGRESS NOTES
Dx:  Urinary incontinence and cystocele/rectocyele and uterine prolapse      Authorized # of Visits:  10         Next MD visit: none scheduled  Fall Risk: standard         Precautions: n/a             Subjective:   Pt better with HEP.  Less leakage noted wi forward and laterally   10 x   Squats with green band 10 x 2 abd     Ball over head 10 x YSB    Bridge 10 x  Clams 10 x Ball over head 10 x  Ball above the head 10 x  Ball on wall hip abd green band 10 x  90/90 abd exercises  Planks 10 x each       Clams 1

## 2019-05-29 ENCOUNTER — APPOINTMENT (OUTPATIENT)
Dept: PHYSICAL THERAPY | Age: 54
End: 2019-05-29
Attending: OBSTETRICS & GYNECOLOGY
Payer: COMMERCIAL

## 2019-06-05 ENCOUNTER — APPOINTMENT (OUTPATIENT)
Dept: PHYSICAL THERAPY | Age: 54
End: 2019-06-05
Attending: OBSTETRICS & GYNECOLOGY
Payer: COMMERCIAL

## 2019-06-19 ENCOUNTER — OFFICE VISIT (OUTPATIENT)
Dept: UROLOGY | Facility: HOSPITAL | Age: 54
End: 2019-06-19
Attending: OBSTETRICS & GYNECOLOGY
Payer: COMMERCIAL

## 2019-06-19 VITALS
BODY MASS INDEX: 25.99 KG/M2 | SYSTOLIC BLOOD PRESSURE: 108 MMHG | DIASTOLIC BLOOD PRESSURE: 68 MMHG | WEIGHT: 156 LBS | HEIGHT: 65 IN

## 2019-06-19 DIAGNOSIS — N39.3 SUI (STRESS URINARY INCONTINENCE, FEMALE): ICD-10-CM

## 2019-06-19 DIAGNOSIS — N81.2 CYSTOCELE AND RECTOCELE WITH INCOMPLETE UTEROVAGINAL PROLAPSE: ICD-10-CM

## 2019-06-19 DIAGNOSIS — N81.2 INCOMPLETE UTEROVAGINAL PROLAPSE: Primary | ICD-10-CM

## 2019-06-19 PROCEDURE — 99211 OFF/OP EST MAY X REQ PHY/QHP: CPT

## 2019-06-19 NOTE — PROGRESS NOTES
ID: Bobbi Galeano  : 1965  Date: 19       CC:  Patient presents with:  PT Follow-Up: reports 50% improvement with DIANA and prolapse sx      HPI:  The patient is a 46year-old female,  (vaginal deliveries), who was last seen in the office on 3 Family History   Problem Relation Age of Onset   • Heart Disease Father    • Other (acute MI) Father    • Other (hyperlipidemia) Father    • Heart Disease Mother    • Other (hyperlipidemia) Mother    • Heart Disease Maternal Grandfather    • Cancer Pater lesions. PELVIC EXAM:  External Genitalia: Normal appearance for age. No atrophy, no lesions  Supine cough stress test negative. Evaluation of pelvic support is unchanged from previous visit.    Pelvic floor muscle strength is improved: 3/5    PELVIC GARRIDO

## 2019-06-26 ENCOUNTER — OFFICE VISIT (OUTPATIENT)
Dept: UROLOGY | Facility: HOSPITAL | Age: 54
End: 2019-06-26
Attending: OBSTETRICS & GYNECOLOGY
Payer: COMMERCIAL

## 2019-06-26 VITALS
HEIGHT: 65 IN | BODY MASS INDEX: 25.99 KG/M2 | WEIGHT: 156 LBS | SYSTOLIC BLOOD PRESSURE: 140 MMHG | DIASTOLIC BLOOD PRESSURE: 88 MMHG

## 2019-06-26 DIAGNOSIS — N81.2 INCOMPLETE UTEROVAGINAL PROLAPSE: Primary | ICD-10-CM

## 2019-06-26 DIAGNOSIS — N81.2 CYSTOCELE AND RECTOCELE WITH INCOMPLETE UTEROVAGINAL PROLAPSE: ICD-10-CM

## 2019-06-26 DIAGNOSIS — N39.3 SUI (STRESS URINARY INCONTINENCE, FEMALE): ICD-10-CM

## 2019-06-26 PROCEDURE — 51741 ELECTRO-UROFLOWMETRY FIRST: CPT

## 2019-06-26 PROCEDURE — 51784 ANAL/URINARY MUSCLE STUDY: CPT

## 2019-06-26 PROCEDURE — 51729 CYSTOMETROGRAM W/VP&UP: CPT

## 2019-06-26 PROCEDURE — 51797 INTRAABDOMINAL PRESSURE TEST: CPT

## 2019-06-26 NOTE — PROCEDURES
Patient here for urodynamic testing. Procedure explained and confirmed by patient. See evaluation form for results. Both verbal and written discharge instructions were given.   Patient tolerated procedure well and will follow up with Dr. Lina Denny void:  371 mL  Maximum cystometric capacity:   544 mL  Detrusor Activity:  []  Unstable   [x]  Stable  Urge leakage?     []  Yes [x]  No  Volume at 1st unhibited detrusor cont:   n/a mL    Additional Notes:      URETHRAL FUNCTION:  Valsava (vesical) Leak Po

## 2019-06-26 NOTE — PATIENT INSTRUCTIONS
[facility]  [address] [place] [address],[address]  [place], [address] [facility]  Office: [phone]      Urodynamic Testing Discharge Instructions: There are NO dietary or activity restrictions. You may resume your normal schedule.       You may hav

## 2019-07-03 ENCOUNTER — OFFICE VISIT (OUTPATIENT)
Dept: UROLOGY | Facility: HOSPITAL | Age: 54
End: 2019-07-03
Attending: OBSTETRICS & GYNECOLOGY
Payer: COMMERCIAL

## 2019-07-03 VITALS — SYSTOLIC BLOOD PRESSURE: 114 MMHG | DIASTOLIC BLOOD PRESSURE: 78 MMHG

## 2019-07-03 DIAGNOSIS — N39.3 SUI (STRESS URINARY INCONTINENCE, FEMALE): ICD-10-CM

## 2019-07-03 DIAGNOSIS — N81.6 RECTOCELE: ICD-10-CM

## 2019-07-03 DIAGNOSIS — N81.2 CYSTOCELE AND RECTOCELE WITH INCOMPLETE UTEROVAGINAL PROLAPSE: ICD-10-CM

## 2019-07-03 DIAGNOSIS — N81.2 INCOMPLETE UTEROVAGINAL PROLAPSE: Primary | ICD-10-CM

## 2019-07-03 NOTE — PROGRESS NOTES
Grady Cherry  5/9/1965  7/3/19    CC: Follow up Urodynamic testing    HPI:  Pt presents w/ initial c/o prolapse. She is status post pelvic floor PT with only modest improvement. She is thingking about surgery. She agreed to proceed with pre-op workup. Surgery (Urogynecology)  [phone] (Pager)

## 2019-07-16 DIAGNOSIS — N81.2 CYSTOCELE AND RECTOCELE WITH INCOMPLETE UTEROVAGINAL PROLAPSE: Primary | ICD-10-CM

## 2019-07-16 DIAGNOSIS — N81.6 RECTOCELE: ICD-10-CM

## 2019-07-16 DIAGNOSIS — N39.3 STRESS INCONTINENCE, FEMALE: ICD-10-CM

## 2019-07-18 ENCOUNTER — TELEPHONE (OUTPATIENT)
Dept: OBGYN CLINIC | Facility: CLINIC | Age: 54
End: 2019-07-18

## 2019-08-01 ENCOUNTER — HOSPITAL ENCOUNTER (OUTPATIENT)
Dept: MAMMOGRAPHY | Age: 54
Discharge: HOME | End: 2019-08-01
Attending: OBSTETRICS & GYNECOLOGY
Payer: COMMERCIAL

## 2019-08-01 DIAGNOSIS — Z12.39 BREAST CANCER SCREENING: ICD-10-CM

## 2019-08-01 DIAGNOSIS — Z12.31 ENCOUNTER FOR SCREENING MAMMOGRAM FOR MALIGNANT NEOPLASM OF BREAST: ICD-10-CM

## 2019-08-01 PROCEDURE — 77063 BREAST TOMOSYNTHESIS BI: CPT | Performed by: OBSTETRICS & GYNECOLOGY

## 2019-08-01 PROCEDURE — 77067 SCR MAMMO BI INCL CAD: CPT | Performed by: OBSTETRICS & GYNECOLOGY

## 2019-08-06 ENCOUNTER — OFFICE VISIT (OUTPATIENT)
Dept: INTERNAL MEDICINE CLINIC | Facility: CLINIC | Age: 54
End: 2019-08-06
Payer: COMMERCIAL

## 2019-08-06 VITALS
WEIGHT: 158.63 LBS | SYSTOLIC BLOOD PRESSURE: 134 MMHG | BODY MASS INDEX: 26.43 KG/M2 | HEART RATE: 72 BPM | DIASTOLIC BLOOD PRESSURE: 84 MMHG | HEIGHT: 65 IN | RESPIRATION RATE: 16 BRPM

## 2019-08-06 DIAGNOSIS — N81.4 UTEROVAGINAL PROLAPSE, UNSPECIFIED: ICD-10-CM

## 2019-08-06 DIAGNOSIS — R94.31 ABNORMAL EKG: ICD-10-CM

## 2019-08-06 DIAGNOSIS — Z01.818 PRE-OP EXAMINATION: Primary | ICD-10-CM

## 2019-08-06 PROCEDURE — 93000 ELECTROCARDIOGRAM COMPLETE: CPT | Performed by: INTERNAL MEDICINE

## 2019-08-06 PROCEDURE — 99243 OFF/OP CNSLTJ NEW/EST LOW 30: CPT | Performed by: INTERNAL MEDICINE

## 2019-08-06 NOTE — PROGRESS NOTES
Jorge Diaz is a 53year old female.   Patient presents with:  Pre-Op Exam: cn room 3: pre-op uterovaginal prolapse surgery with Bettylou Harada at [facility]       HPI:     Patient with history of uterovaginal prolapse, stress incontinence, migraines here fo Chronic rhinitis    • Dizziness and giddiness    • H/O mammogram 10/13,10/12    negative   • Heart murmur     mitro valve prolapse   • Hernia     Hietal   • High cholesterol    • Pap smear for cervical cancer screening 12/14,09/13,07/11    negative      So deficits    EKG: NSR at 67 bpm, nonspecific ST and T wave abnormalities in anterior leads- new from previous EKG    ASSESSMENT AND PLAN:   Pre-op examination  - EKG has changed since 2017 and it has new nonspecific ST and T wave changes.  I will order stres

## 2019-08-07 RX ORDER — ACETAMINOPHEN 500 MG
1000 TABLET ORAL ONCE
Status: CANCELLED | OUTPATIENT
Start: 2019-08-07 | End: 2019-08-07

## 2019-08-12 ENCOUNTER — TELEPHONE (OUTPATIENT)
Dept: UROLOGY | Facility: HOSPITAL | Age: 54
End: 2019-08-12

## 2019-08-12 NOTE — TELEPHONE ENCOUNTER
Pt inquiring about insurance not covering for 1 night stay in hospital for surgery with Dr. Kayla Cortez and Dr. Stacy Pena. Explained to pt she will have to call [facility] to see what is covered, we do not handle the insurance end of surgery, [facility] does.   [facility]

## 2019-08-14 ENCOUNTER — HOSPITAL ENCOUNTER (OUTPATIENT)
Dept: CV DIAGNOSTICS | Facility: HOSPITAL | Age: 54
Discharge: HOME | End: 2019-08-14
Attending: INTERNAL MEDICINE
Payer: COMMERCIAL

## 2019-08-14 DIAGNOSIS — R94.31 ABNORMAL EKG: ICD-10-CM

## 2019-08-14 DIAGNOSIS — Z01.818 PRE-OP EXAMINATION: ICD-10-CM

## 2019-08-14 DIAGNOSIS — N81.4 UTEROVAGINAL PROLAPSE, UNSPECIFIED: ICD-10-CM

## 2019-08-14 PROCEDURE — 93017 CV STRESS TEST TRACING ONLY: CPT | Performed by: INTERNAL MEDICINE

## 2019-08-14 PROCEDURE — 93350 STRESS TTE ONLY: CPT | Performed by: INTERNAL MEDICINE

## 2019-08-14 PROCEDURE — 93018 CV STRESS TEST I&R ONLY: CPT | Performed by: INTERNAL MEDICINE

## 2019-08-16 ENCOUNTER — LAB ENCOUNTER (OUTPATIENT)
Dept: LAB | Age: 54
End: 2019-08-16
Attending: INTERNAL MEDICINE
Payer: COMMERCIAL

## 2019-08-16 DIAGNOSIS — Z01.818 PRE-OP EXAMINATION: ICD-10-CM

## 2019-08-16 DIAGNOSIS — N81.4 UTEROVAGINAL PROLAPSE, UNSPECIFIED: ICD-10-CM

## 2019-08-16 DIAGNOSIS — R94.31 ABNORMAL EKG: ICD-10-CM

## 2019-08-16 LAB
ANION GAP SERPL CALC-SCNC: 8 MMOL/L (ref 0–18)
BASOPHILS # BLD AUTO: 0.08 X10(3) UL (ref 0–0.2)
BASOPHILS NFR BLD AUTO: 1.2 %
BUN BLD-MCNC: 14 MG/DL (ref 7–18)
BUN/CREAT SERPL: 13.5 (ref 10–20)
CALCIUM BLD-MCNC: 8.7 MG/DL (ref 8.5–10.1)
CHLORIDE SERPL-SCNC: 106 MMOL/L (ref 98–112)
CO2 SERPL-SCNC: 27 MMOL/L (ref 21–32)
CREAT BLD-MCNC: 1.04 MG/DL (ref 0.55–1.02)
DEPRECATED RDW RBC AUTO: 49.2 FL (ref 35.1–46.3)
EOSINOPHIL # BLD AUTO: 0.15 X10(3) UL (ref 0–0.7)
EOSINOPHIL NFR BLD AUTO: 2.2 %
ERYTHROCYTE [DISTWIDTH] IN BLOOD BY AUTOMATED COUNT: 13.9 % (ref 11–15)
GLUCOSE BLD-MCNC: 64 MG/DL (ref 70–99)
HCT VFR BLD AUTO: 42.6 % (ref 35–48)
HGB BLD-MCNC: 13.9 G/DL (ref 12–16)
IMM GRANULOCYTES # BLD AUTO: 0.02 X10(3) UL (ref 0–1)
IMM GRANULOCYTES NFR BLD: 0.3 %
LYMPHOCYTES # BLD AUTO: 1.86 X10(3) UL (ref 1–4)
LYMPHOCYTES NFR BLD AUTO: 27.2 %
MCH RBC QN AUTO: 31.3 PG (ref 26–34)
MCHC RBC AUTO-ENTMCNC: 32.6 G/DL (ref 31–37)
MCV RBC AUTO: 95.9 FL (ref 80–100)
MONOCYTES # BLD AUTO: 0.64 X10(3) UL (ref 0.1–1)
MONOCYTES NFR BLD AUTO: 9.3 %
NEUTROPHILS # BLD AUTO: 4.1 X10 (3) UL (ref 1.5–7.7)
NEUTROPHILS # BLD AUTO: 4.1 X10(3) UL (ref 1.5–7.7)
NEUTROPHILS NFR BLD AUTO: 59.8 %
OSMOLALITY SERPL CALC.SUM OF ELEC: 291 MOSM/KG (ref 275–295)
PLATELET # BLD AUTO: 378 10(3)UL (ref 150–450)
POTASSIUM SERPL-SCNC: 3.8 MMOL/L (ref 3.5–5.1)
RBC # BLD AUTO: 4.44 X10(6)UL (ref 3.8–5.3)
SODIUM SERPL-SCNC: 141 MMOL/L (ref 136–145)
WBC # BLD AUTO: 6.9 X10(3) UL (ref 4–11)

## 2019-08-16 PROCEDURE — 36415 COLL VENOUS BLD VENIPUNCTURE: CPT | Performed by: INTERNAL MEDICINE

## 2019-08-16 PROCEDURE — 80048 BASIC METABOLIC PNL TOTAL CA: CPT | Performed by: INTERNAL MEDICINE

## 2019-08-16 PROCEDURE — 85025 COMPLETE CBC W/AUTO DIFF WBC: CPT | Performed by: INTERNAL MEDICINE

## 2019-08-22 ENCOUNTER — TELEPHONE (OUTPATIENT)
Dept: OBGYN CLINIC | Facility: CLINIC | Age: 54
End: 2019-08-22

## 2019-08-22 NOTE — TELEPHONE ENCOUNTER
Insurance replied to prior auth for upcoming TVH with a denial stating the goal for this surgery is outpatient. Due to this surgery being performed in conjunction with another provider that requires an inpatient stay, this cannot be done as outpatient.  I l

## 2019-08-26 NOTE — TELEPHONE ENCOUNTER
After urgent appeal, [employer] ([phone])[employer] indicated that they have approved the Surgery, [id number] vaginal hysterectomy as long as we don't bill as inpatient. ID# [id number]  Call placed to Raghu Jain to inform her we are good to move forward.  She has some clinica

## 2019-08-29 ENCOUNTER — ANESTHESIA (OUTPATIENT)
Dept: SURGERY | Facility: HOSPITAL | Age: 54
DRG: 743 | End: 2019-08-29
Payer: COMMERCIAL

## 2019-08-29 ENCOUNTER — HOSPITAL ENCOUNTER (INPATIENT)
Facility: HOSPITAL | Age: 54
LOS: 2 days | Discharge: HOME | DRG: 743 | End: 2019-08-31
Attending: OBSTETRICS & GYNECOLOGY | Admitting: OBSTETRICS & GYNECOLOGY
Payer: COMMERCIAL

## 2019-08-29 ENCOUNTER — ANESTHESIA EVENT (OUTPATIENT)
Dept: SURGERY | Facility: HOSPITAL | Age: 54
DRG: 743 | End: 2019-08-29
Payer: COMMERCIAL

## 2019-08-29 DIAGNOSIS — N81.2 CYSTOCELE AND RECTOCELE WITH INCOMPLETE UTEROVAGINAL PROLAPSE: ICD-10-CM

## 2019-08-29 DIAGNOSIS — N39.3 STRESS INCONTINENCE, FEMALE: ICD-10-CM

## 2019-08-29 DIAGNOSIS — N81.6 RECTOCELE: ICD-10-CM

## 2019-08-29 LAB
POCT LOT NUMBER: NORMAL
POCT URINE PREGNANCY: NEGATIVE

## 2019-08-29 PROCEDURE — 0UT97ZZ RESECTION OF UTERUS, VIA NATURAL OR ARTIFICIAL OPENING: ICD-10-PCS | Performed by: OBSTETRICS & GYNECOLOGY

## 2019-08-29 PROCEDURE — 0UQF0ZZ REPAIR CUL-DE-SAC, OPEN APPROACH: ICD-10-PCS | Performed by: OBSTETRICS & GYNECOLOGY

## 2019-08-29 PROCEDURE — 0UT77ZZ RESECTION OF BILATERAL FALLOPIAN TUBES, VIA NATURAL OR ARTIFICIAL OPENING: ICD-10-PCS | Performed by: OBSTETRICS & GYNECOLOGY

## 2019-08-29 PROCEDURE — 58262 VAG HYST INCLUDING T/O: CPT | Performed by: OBSTETRICS & GYNECOLOGY

## 2019-08-29 PROCEDURE — 0TSD0ZZ REPOSITION URETHRA, OPEN APPROACH: ICD-10-PCS | Performed by: OBSTETRICS & GYNECOLOGY

## 2019-08-29 PROCEDURE — 0JQC0ZZ REPAIR PELVIC REGION SUBCUTANEOUS TISSUE AND FASCIA, OPEN APPROACH: ICD-10-PCS | Performed by: OBSTETRICS & GYNECOLOGY

## 2019-08-29 PROCEDURE — 0TJB8ZZ INSPECTION OF BLADDER, VIA NATURAL OR ARTIFICIAL OPENING ENDOSCOPIC: ICD-10-PCS | Performed by: UROLOGY

## 2019-08-29 PROCEDURE — 0TSC0ZZ REPOSITION BLADDER NECK, OPEN APPROACH: ICD-10-PCS | Performed by: OBSTETRICS & GYNECOLOGY

## 2019-08-29 DEVICE — TRANSVAGINAL MID-URETHRAL SLING
Type: IMPLANTABLE DEVICE | Site: PELVIS | Status: FUNCTIONAL
Brand: ADVANTAGE FIT™ BLUE SYSTEM

## 2019-08-29 RX ORDER — HYDROCODONE BITARTRATE AND ACETAMINOPHEN 5; 325 MG/1; MG/1
1 TABLET ORAL AS NEEDED
Status: DISCONTINUED | OUTPATIENT
Start: 2019-08-29 | End: 2019-08-29 | Stop reason: HOSPADM

## 2019-08-29 RX ORDER — DEXTROSE AND SODIUM CHLORIDE 5; .9 G/100ML; G/100ML
INJECTION, SOLUTION INTRAVENOUS CONTINUOUS
Status: DISCONTINUED | OUTPATIENT
Start: 2019-08-29 | End: 2019-08-31

## 2019-08-29 RX ORDER — CEFAZOLIN SODIUM/WATER 2 G/20 ML
2 SYRINGE (ML) INTRAVENOUS EVERY 8 HOURS
Status: COMPLETED | OUTPATIENT
Start: 2019-08-30 | End: 2019-08-30

## 2019-08-29 RX ORDER — PHENAZOPYRIDINE HYDROCHLORIDE 200 MG/1
200 TABLET, FILM COATED ORAL ONCE
Status: COMPLETED | OUTPATIENT
Start: 2019-08-29 | End: 2019-08-29

## 2019-08-29 RX ORDER — METOCLOPRAMIDE HYDROCHLORIDE 5 MG/ML
10 INJECTION INTRAMUSCULAR; INTRAVENOUS AS NEEDED
Status: DISCONTINUED | OUTPATIENT
Start: 2019-08-29 | End: 2019-08-29 | Stop reason: HOSPADM

## 2019-08-29 RX ORDER — DIPHENHYDRAMINE HYDROCHLORIDE 50 MG/ML
12.5 INJECTION INTRAMUSCULAR; INTRAVENOUS EVERY 4 HOURS PRN
Status: DISCONTINUED | OUTPATIENT
Start: 2019-08-29 | End: 2019-08-31

## 2019-08-29 RX ORDER — ENOXAPARIN SODIUM 100 MG/ML
40 INJECTION SUBCUTANEOUS DAILY
Status: DISCONTINUED | OUTPATIENT
Start: 2019-08-30 | End: 2019-08-31

## 2019-08-29 RX ORDER — CEFAZOLIN SODIUM/WATER 2 G/20 ML
2 SYRINGE (ML) INTRAVENOUS ONCE
Status: COMPLETED | OUTPATIENT
Start: 2019-08-29 | End: 2019-08-29

## 2019-08-29 RX ORDER — NALBUPHINE HCL 10 MG/ML
2.5 AMPUL (ML) INJECTION EVERY 4 HOURS PRN
Status: DISCONTINUED | OUTPATIENT
Start: 2019-08-29 | End: 2019-08-31

## 2019-08-29 RX ORDER — ACETAMINOPHEN 10 MG/ML
INJECTION, SOLUTION INTRAVENOUS
Status: DISCONTINUED | OUTPATIENT
Start: 2019-08-29 | End: 2019-08-29 | Stop reason: HOSPADM

## 2019-08-29 RX ORDER — SODIUM CHLORIDE, SODIUM LACTATE, POTASSIUM CHLORIDE, CALCIUM CHLORIDE 600; 310; 30; 20 MG/100ML; MG/100ML; MG/100ML; MG/100ML
INJECTION, SOLUTION INTRAVENOUS CONTINUOUS
Status: DISCONTINUED | OUTPATIENT
Start: 2019-08-29 | End: 2019-08-29 | Stop reason: HOSPADM

## 2019-08-29 RX ORDER — DIPHENHYDRAMINE HYDROCHLORIDE 50 MG/ML
12.5 INJECTION INTRAMUSCULAR; INTRAVENOUS AS NEEDED
Status: DISCONTINUED | OUTPATIENT
Start: 2019-08-29 | End: 2019-08-29 | Stop reason: HOSPADM

## 2019-08-29 RX ORDER — HYDROMORPHONE HYDROCHLORIDE 1 MG/ML
0.4 INJECTION, SOLUTION INTRAMUSCULAR; INTRAVENOUS; SUBCUTANEOUS EVERY 5 MIN PRN
Status: DISCONTINUED | OUTPATIENT
Start: 2019-08-29 | End: 2019-08-29 | Stop reason: HOSPADM

## 2019-08-29 RX ORDER — BUPIVACAINE HYDROCHLORIDE AND EPINEPHRINE 2.5; 5 MG/ML; UG/ML
INJECTION, SOLUTION EPIDURAL; INFILTRATION; INTRACAUDAL; PERINEURAL AS NEEDED
Status: DISCONTINUED | OUTPATIENT
Start: 2019-08-29 | End: 2019-08-29 | Stop reason: HOSPADM

## 2019-08-29 RX ORDER — ONDANSETRON 2 MG/ML
4 INJECTION INTRAMUSCULAR; INTRAVENOUS AS NEEDED
Status: DISCONTINUED | OUTPATIENT
Start: 2019-08-29 | End: 2019-08-29 | Stop reason: HOSPADM

## 2019-08-29 RX ORDER — CEFAZOLIN SODIUM/WATER 2 G/20 ML
2 SYRINGE (ML) INTRAVENOUS EVERY 8 HOURS
Status: DISCONTINUED | OUTPATIENT
Start: 2019-08-29 | End: 2019-08-29 | Stop reason: SDUPTHER

## 2019-08-29 RX ORDER — KETOROLAC TROMETHAMINE 30 MG/ML
30 INJECTION, SOLUTION INTRAMUSCULAR; INTRAVENOUS EVERY 6 HOURS PRN
Status: DISCONTINUED | OUTPATIENT
Start: 2019-08-29 | End: 2019-08-31

## 2019-08-29 RX ORDER — HYDROMORPHONE HYDROCHLORIDE 1 MG/ML
INJECTION, SOLUTION INTRAMUSCULAR; INTRAVENOUS; SUBCUTANEOUS
Status: COMPLETED
Start: 2019-08-29 | End: 2019-08-29

## 2019-08-29 RX ORDER — NALOXONE HYDROCHLORIDE 0.4 MG/ML
0.08 INJECTION, SOLUTION INTRAMUSCULAR; INTRAVENOUS; SUBCUTANEOUS
Status: DISCONTINUED | OUTPATIENT
Start: 2019-08-29 | End: 2019-08-31

## 2019-08-29 RX ORDER — CEFAZOLIN SODIUM 1 G/3ML
INJECTION, POWDER, FOR SOLUTION INTRAMUSCULAR; INTRAVENOUS
Status: DISCONTINUED | OUTPATIENT
Start: 2019-08-29 | End: 2019-08-29 | Stop reason: HOSPADM

## 2019-08-29 RX ORDER — HYDROCODONE BITARTRATE AND ACETAMINOPHEN 5; 325 MG/1; MG/1
2 TABLET ORAL AS NEEDED
Status: DISCONTINUED | OUTPATIENT
Start: 2019-08-29 | End: 2019-08-29 | Stop reason: HOSPADM

## 2019-08-29 RX ORDER — MEPERIDINE HYDROCHLORIDE 25 MG/ML
12.5 INJECTION INTRAMUSCULAR; INTRAVENOUS; SUBCUTANEOUS AS NEEDED
Status: DISCONTINUED | OUTPATIENT
Start: 2019-08-29 | End: 2019-08-29 | Stop reason: HOSPADM

## 2019-08-29 RX ORDER — NALOXONE HYDROCHLORIDE 0.4 MG/ML
80 INJECTION, SOLUTION INTRAMUSCULAR; INTRAVENOUS; SUBCUTANEOUS AS NEEDED
Status: DISCONTINUED | OUTPATIENT
Start: 2019-08-29 | End: 2019-08-29 | Stop reason: HOSPADM

## 2019-08-29 RX ORDER — ONDANSETRON 2 MG/ML
4 INJECTION INTRAMUSCULAR; INTRAVENOUS EVERY 6 HOURS PRN
Status: DISCONTINUED | OUTPATIENT
Start: 2019-08-29 | End: 2019-08-31

## 2019-08-29 NOTE — INTERVAL H&P NOTE
Pre-op Diagnosis: Cystocele and rectocele with incomplete uterovaginal prolapse [N81.2]  Rectocele [N81.6]  Stress incontinence, female [N39.3]    The above referenced H&P was reviewed by Yulisa Velázquez MD on 8/29/2019, the patient was examined and no si

## 2019-08-29 NOTE — BRIEF OP NOTE
Pre-Operative Diagnosis: Cystocele and rectocele with incomplete uterovaginal prolapse [N81.2]  Rectocele [N81.6]  Stress incontinence, female [N39.3]     Post-Operative Diagnosis: Cystocele and rectocele with incomplete uterovaginal prolapse [Q44. 2]Rectoc

## 2019-08-29 NOTE — BRIEF OP NOTE
Pre-Operative Diagnosis: Cystocele and rectocele with incomplete uterovaginal prolapse [N81.2]  Rectocele [N81.6]  Stress incontinence, female [N39.3]     Post-Operative Diagnosis: Cystocele and rectocele with incomplete uterovaginal prolapse [V67. 2]Rectoc

## 2019-08-29 NOTE — ANESTHESIA PREPROCEDURE EVALUATION
PRE-OP EVALUATION    Patient Name: Mi Rose    Pre-op Diagnosis: Cystocele and rectocele with incomplete uterovaginal prolapse [N81.2]  Rectocele [N81.6]  Stress incontinence, female [N39.3]    Procedure(s):  Total Vaginal Hysterectomy with bilateral s Cardiovascular    Negative cardiovascular ROS.                 (+) hyperlipidemia                                  Endo/Other    Negative endo/other ROS. Pulmonary    Negative pulmonary ROS. ASA: 2   Plan: general  NPO status verified and patient meets guidelines. Post-procedure pain management plan discussed with surgeon and patient.     Comment: I explained intrinsic risks of general anesthesia, including nausea, dental damage, so

## 2019-08-29 NOTE — H&P
[facility]    History & Physical    Natasha Rivera Patient Status:  Outpatient in a Bed    1965 MRN [id number]   [facility] SURGERY Attending Jayden Mcarthur, [address] [facility] Day # 0 PCP Tierra Moore MD     Reason for Admission:  [place] History    Tobacco Use      Smoking status: Former Smoker        Types: Cigarettes        Quit date: 1987        Years since quittin.6      Smokeless tobacco: Never Used    Alcohol use:  Yes      Alcohol/week: 1.0 standard drinks      Types: 1 St with patient mgmt options for her symptoms. She wants to move forward with surgery. I would recommend a vaginal route with TVH, BS, USLS, AP repair, enterocele repair, sling, cystoscopy.  She is a patient of Dr. Lesly Ott, so she will perform the hysterect

## 2019-08-29 NOTE — H&P
Lynn Yates is a 53year old female [id number] Patient's last menstrual period was 2019 (exact date). No chief complaint on file.   .  Patient c/o stress urinary incontinence, uterine prolapse  OBSTETRICS HISTORY:  OB History    Para Term  Not on file    Tobacco Use      Smoking status: Former Smoker        Types: Cigarettes        Quit date: 1987        Years since quittin.6      Smokeless tobacco: Never Used    Substance and Sexual Activity      Alcohol use:  Yes        Alcohol/w Maternal Grandfather    • Cancer Paternal Uncle         lung   • Heart Disease Maternal Aunt    • Stroke Neg        MEDICATIONS:  No current outpatient medications on file.     ALLERGIES:    Pollen                  OTHER (SEE COMMENTS)    Comment:Sneezing, tenderness  Adnexa: normal without masses or tenderness  Perineum: normal  Anus: no hemorroids     Assessment & Plan:  Diagnoses and all orders for this visit:    Cystocele and rectocele with incomplete uterovaginal prolapse  -     [facility] CASE REQUEST SURGICA

## 2019-08-29 NOTE — ANESTHESIA POSTPROCEDURE EVALUATION
12[address] [facility] Patient Status:  Outpatient in a Bed   Age/Gender 53year old female MRN [id number]   Location [facility] Attending Lorin Aquino, [address] [facility] Day # 0 PCP Ronald Lorenzana MD       Anesthesia Pos

## 2019-08-30 LAB
BASOPHILS # BLD AUTO: 0.02 X10(3) UL (ref 0–0.2)
BASOPHILS NFR BLD AUTO: 0.1 %
DEPRECATED RDW RBC AUTO: 48.3 FL (ref 35.1–46.3)
EOSINOPHIL # BLD AUTO: 0 X10(3) UL (ref 0–0.7)
EOSINOPHIL NFR BLD AUTO: 0 %
ERYTHROCYTE [DISTWIDTH] IN BLOOD BY AUTOMATED COUNT: 13.6 % (ref 11–15)
HCT VFR BLD AUTO: 34.7 % (ref 35–48)
HGB BLD-MCNC: 11.3 G/DL (ref 12–16)
IMM GRANULOCYTES # BLD AUTO: 0.1 X10(3) UL (ref 0–1)
IMM GRANULOCYTES NFR BLD: 0.5 %
LYMPHOCYTES # BLD AUTO: 1.13 X10(3) UL (ref 1–4)
LYMPHOCYTES NFR BLD AUTO: 6.1 %
MCH RBC QN AUTO: 31 PG (ref 26–34)
MCHC RBC AUTO-ENTMCNC: 32.6 G/DL (ref 31–37)
MCV RBC AUTO: 95.3 FL (ref 80–100)
MONOCYTES # BLD AUTO: 1.68 X10(3) UL (ref 0.1–1)
MONOCYTES NFR BLD AUTO: 9.1 %
NEUTROPHILS # BLD AUTO: 15.57 X10 (3) UL (ref 1.5–7.7)
NEUTROPHILS # BLD AUTO: 15.57 X10(3) UL (ref 1.5–7.7)
NEUTROPHILS NFR BLD AUTO: 84.2 %
PLATELET # BLD AUTO: 295 10(3)UL (ref 150–450)
RBC # BLD AUTO: 3.64 X10(6)UL (ref 3.8–5.3)
WBC # BLD AUTO: 18.5 X10(3) UL (ref 4–11)

## 2019-08-30 PROCEDURE — 52000 CYSTOURETHROSCOPY: CPT | Performed by: UROLOGY

## 2019-08-30 RX ORDER — HYDROCODONE BITARTRATE AND ACETAMINOPHEN 5; 325 MG/1; MG/1
1 TABLET ORAL EVERY 6 HOURS PRN
Status: DISCONTINUED | OUTPATIENT
Start: 2019-08-30 | End: 2019-08-31

## 2019-08-30 RX ORDER — POLYETHYLENE GLYCOL 3350 17 G/17G
17 POWDER, FOR SOLUTION ORAL DAILY
Status: DISCONTINUED | OUTPATIENT
Start: 2019-08-30 | End: 2019-08-31

## 2019-08-30 NOTE — PLAN OF CARE
Patient AOX4. Slightly drowsy. PCA in place, instructions explained. Complaints of pressure with catheter. POC discussed, all questions and concerns addressed. All safety measures in place.

## 2019-08-30 NOTE — OPERATIVE REPORT
Operative Note    Patient Name: Ethel Lopez    Date of Procedure: 8/30/2019    Preoperative Diagnosis: Cystocele and rectocele with incomplete uterovaginal prolapse [N81.2]  Rectocele [N81.6]  Stress incontinence, female [N39.3]    Postoperative Diagnosis

## 2019-08-30 NOTE — PLAN OF CARE
PT HAD AN EMISIS EPISODE THIS MORNING. ANTIEMETIC GIVEN. 2 LAP SITES CDI. ESTER PAD WITH OLD DRAINAGE, PAD CHANGED. DIETRICH WILL MODERATE CLEAR YELLOW URINE. WILL TEACH PT DIETRICH CARE AND LEG BAG TEACHING BEFORE D/C.

## 2019-08-30 NOTE — PROGRESS NOTES
53year old y/o female s/p TVH, BS, USLS, A&P repair, enterocele repair, sling, cystoscopy. POD #1  Pt resting in bed. Pain well controlled. Main issue is nausea. Has migraine headache too. Has only ambulated once. Passed some flatus.       /63 (BP

## 2019-08-30 NOTE — CONSULTS
[facility] Pharmacy Note:  Pain Consult    Alicia Pimentel is a 53year old female started on Dilaudid PCA by Dr. Nav Box. Pharmacy was consulted to review medication profile and to discontinue previously ordered narcotics and sedatives.     Medication profile

## 2019-08-30 NOTE — PLAN OF CARE
Problem: PAIN - ADULT  Goal: Verbalizes/displays adequate comfort level or patient's stated pain goal  Description  INTERVENTIONS:  - Encourage pt to monitor pain and request assistance  - Assess pain using appropriate pain scale  - Administer analgesics dressing/incision, wound bed, drain sites and surrounding tissue  - Implement wound care per orders  - Initiate isolation precautions as appropriate  - Initiate Pressure Ulcer prevention bundle as indicated  Outcome: Progressing

## 2019-08-30 NOTE — PAYOR COMM NOTE
--------------  ADMISSION REVIEW     Payor: [address] [place] [address] KATH/THEODORA  Subscriber #:  [id number]  Authorization Number: [id number]    Admit date: 8/29/19  Admit time: 1849       Admitting Physician: Maryam Blancas DO  Attending Physician:  Maryam Blancas DO • Knee arthroscopy Right 09/2047    cartilage transplant   • Leg/ankle surgery proc unlisted      ankle exostectomy fibula   • Other  1/1/90    ovarian cystecyomy right   • Other surgical history  07/2016    rotator cuff/bicep repair       SOCIAL HISTORY:  Service: Not Asked        Blood Transfusions: Not Asked        Caffeine Concern: No          coffee        Occupational Exposure: Not Asked        Hobby Hazards: Not Asked        Sleep Concern: Not Asked        Stress Concern: Not Asked Lymphatic:no abnormal supraclavicular or axillary adenopathy is noted  Breast: normal without palpable masses, tenderness, asymmetry, nipple discharge, nipple retraction or skin changes  Abdomen:  soft, nontender, nondistended, no masses  Skin/Hair: no unu Procedures:  Repair of enterocele; uterosacral ligament suspension; anterior colporrhaphy; posterior colporrhaphy; retropubic midurethral sling, cystoscopy.      Yovani Young MD     Assistant: Monik Urena CSA     Intra-operative consul Following this, Allis clamps were used to grasp the redundant anterior vaginal epithelium in the midline and a midline incision was made after infiltration of 0.25% Marcaine with epinephrine.   The redundant epithelium was dissected from the underlying endo At this point, I decided to take down the highest uterosacral suture on the right. Cystoscopy was repeated and we still had no flow. This was followed by removal of the second suspension suture on the right side.   At this point, I reopened the most dista Cystoscopy was repeated. There was good flow from the left ureter, but the right ureter had sluggish flow. I though, this could be due to edema from all the manipulation.   I asked Dr. Shawn Jama to come in and take a look in case there would be a need to st Specimen: Uterus, right and left fallopian tubes     Estimated Blood Loss: Blood Output: 300 mL (8/29/2019  4:13 PM)        Ashleigh Huntley MD  8/29/2019  6:17 PM           Brief Op Note signed by Jay Boston MD at 8/29/2019  6:18 PM     Author: Constantine Medication profile was reviewed and there are no concurrent medications needing to be discontinued because all other narcotics or sedatives were ordered by the same provider.     Thank you for the consult.     Evelin Guise, [facility]  8/29/20197:21 PM

## 2019-08-30 NOTE — PLAN OF CARE
Problem: Patient/Family Goals  Goal: Patient/Family Long Term Goal  Description  Patient's Long Term Goal: back to baseline    Interventions:  - follow discharge instructions and care plan  - See additional Care Plan goals for specific interventions  Out as needed  - Follow urinary retention protocol/standard of care  - Consider collaborating with pharmacy to review patient's medication profile  - Implement strategies to promote bladder emptying  Outcome: Progressing     Problem: SKIN/TISSUE INTEGRITY - AD

## 2019-08-30 NOTE — OPERATIVE REPORT
Tarun Krueger  : 1965  MRN: [id number]  Date of Surgery: 2019             Pre-operative diagnosis:  1. Stage 2 uterovaginal prolapse, cystocele, rectocele. 2. Stress urinary incontinence.     Post-operative diagnosis:  1. Stage 2 uterovaginal prol ureteral orifices, confirming ureteral patency. Following this, Allis clamps were used to grasp the redundant anterior vaginal epithelium in the midline and a midline incision was made after infiltration of 0.25% Marcaine with epinephrine.   The redunda removal of the second suspension suture on the right side. At this point, I reopened the most distal anterior colporrhaphy incision and took down the anterior colporraphy repair, but still no results.   It was until the [place] suture was taken down, that r in and drained the bladder, which was very full. Once cystoscopy was repeated, there was good, brisk flow from the right ureter, confirming patency. There was no need for stenting. A transurethral catheter was placed and the procedure was concluded.      Sydnee Carrillo

## 2019-08-30 NOTE — PROGRESS NOTES
Pt AOx4. Slightly drowsy. PCA pump PRN, education provided to pt and family. Lungs clear and unlabored. IS up to 2500, encouraged. Repositioned and provided hot pack for lower back pain. Courtney catheter draining yellow. No flatus or belching.  Abdomen rounde

## 2019-08-31 VITALS
HEIGHT: 65 IN | TEMPERATURE: 99 F | RESPIRATION RATE: 18 BRPM | WEIGHT: 148.13 LBS | DIASTOLIC BLOOD PRESSURE: 73 MMHG | HEART RATE: 101 BPM | BODY MASS INDEX: 24.68 KG/M2 | OXYGEN SATURATION: 97 % | SYSTOLIC BLOOD PRESSURE: 122 MMHG

## 2019-08-31 RX ORDER — HYDROCODONE BITARTRATE AND ACETAMINOPHEN 5; 325 MG/1; MG/1
1 TABLET ORAL EVERY 6 HOURS PRN
Qty: 30 TABLET | Refills: 0 | Status: SHIPPED | OUTPATIENT
Start: 2019-08-31 | End: 2019-11-06

## 2019-08-31 NOTE — PLAN OF CARE
Problem: Patient/Problem: PAIN - ADULT  Goal: Verbalizes/displays adequate comfort level or patient's stated pain goal  Description  INTERVENTIONS:  - Encourage pt to monitor pain and request assistance  - Assess pain using appropriate pain scale  - Admi

## 2019-08-31 NOTE — PROGRESS NOTES
AOx4. Lungs clear and unlabored. SCDs on. Pain and nausea relief with medication. 1 walk this shift. Courtney catheter draining clear yellow. 2 lap sites to pubis with skin glue dry and intact. peripad with small drainage. IVF. Tolerating soft diet.  IS up to

## 2019-08-31 NOTE — PLAN OF CARE
Patient is alert and oriented x3  Up ad brigida  Courtney draining clear yellow urine  Denies pain  Incisions to pelvis are CDI  Tolerating diet  No BM noted today      Patient assessed and ready for DC home  All instructions given to patient and .  Courtney t perform bladder scan as needed  - Follow urinary retention protocol/standard of care  - Consider collaborating with pharmacy to review patient's medication profile  - Implement strategies to promote bladder emptying  Outcome: Adequate for Discharge     Pro

## 2019-08-31 NOTE — PROGRESS NOTES
POD #2  Patient feeling better today, no nausea, headache improved, waiting for breakfast tray    /73 (BP Location: Left arm)   Pulse 101   Temp 99.5 °F (37.5 °C) (Oral)   Resp 18   Ht 65\"   Wt 148 lb 2.4 oz   LMP 08/02/2019 (Exact Date)   SpO2 97%

## 2019-08-31 NOTE — PROGRESS NOTES
POD#1  Patient c/o nausea,improved with zofran, and bad headache, took norco 15 min ago, advised to try to eat as it may help with the headache    /64 (BP Location: Left arm)   Pulse 81   Temp 99.4 °F (37.4 °C) (Oral)   Resp 18   Ht 65\"   Wt 148 lb

## 2019-09-03 ENCOUNTER — TELEPHONE (OUTPATIENT)
Dept: UROLOGY | Facility: HOSPITAL | Age: 54
End: 2019-09-03

## 2019-09-03 NOTE — PAYOR COMM NOTE
--------------  DISCHARGE REVIEW    Payor: Prasad OLSEN  Subscriber #:  [id number]  Authorization Number: [id number]    Admit date: 8/29/19  Admit time:  1849  Discharge Date: 8/31/2019 11:25 AM     Admitting Physician: DO Troy Maldonado follow-up consultation with urology if any issues occur.     Brayden Singh M.D.               Electronically signed by Gal Dean MD at 8/30/2019  2:15 PM   POD#1  Patient c/o nausea,improved with zofran, and bad headache, took norco 15 min ag

## 2019-09-03 NOTE — TELEPHONE ENCOUNTER
Pt is s/p TVH, BS, USLS, APER, MUS, cysto with Dr. Dawson Diaz on 8-29-19. Called to request mayes removal.  Has not had BM yet, taking miralax QD, stool softeners, and PO Magnesium tabs.   Reviewed importance of promoting bowel function, pt is passi

## 2019-09-04 ENCOUNTER — TELEPHONE (OUTPATIENT)
Dept: OBGYN CLINIC | Facility: CLINIC | Age: 54
End: 2019-09-04

## 2019-09-04 NOTE — TELEPHONE ENCOUNTER
Per pt she had a surgery last Thursday with dr ANN, she removed the uterus and the fallopian tubes. She is experiencing some bleeding and thinks it is from her vagina ant it is bright red.  Some cramping, but not a lot of pain, she would like to talk to you

## 2019-09-05 ENCOUNTER — OFFICE VISIT (OUTPATIENT)
Dept: UROLOGY | Facility: HOSPITAL | Age: 54
End: 2019-09-05
Attending: OBSTETRICS & GYNECOLOGY
Payer: COMMERCIAL

## 2019-09-05 VITALS
WEIGHT: 148 LBS | HEIGHT: 65 IN | SYSTOLIC BLOOD PRESSURE: 116 MMHG | BODY MASS INDEX: 24.66 KG/M2 | DIASTOLIC BLOOD PRESSURE: 62 MMHG

## 2019-09-05 DIAGNOSIS — R33.8 POSTOPERATIVE URINARY RETENTION: Primary | ICD-10-CM

## 2019-09-05 DIAGNOSIS — N99.89 POSTOPERATIVE URINARY RETENTION: Primary | ICD-10-CM

## 2019-09-05 PROCEDURE — 99212 OFFICE O/P EST SF 10 MIN: CPT

## 2019-09-05 NOTE — PATIENT INSTRUCTIONS
Voiding Trial Instructions  You have passed your voiding trial at 0830. Please make sure you are drinking some water today. You can take your Motrin to help with any swelling from the catheter.   It is important to try and empty your bladder every two h

## 2019-09-05 NOTE — TELEPHONE ENCOUNTER
Patient states that she has noticed small amount of bleeding, she thinks it is coming from her vaginal. Patient also complains of occasional cramping. Patient reassured and instructed to monitor bleeding and take Motrin to help with pain/cramping.  She will

## 2019-09-06 NOTE — TELEPHONE ENCOUNTER
Letter from [facility]. [id number] has been approved. 08/29/2019-08/30/2019.

## 2019-09-10 ENCOUNTER — TELEPHONE (OUTPATIENT)
Dept: OBGYN CLINIC | Facility: CLINIC | Age: 54
End: 2019-09-10

## 2019-09-16 NOTE — DISCHARGE SUMMARY
Admittion Date:8/29/19  Discharge Date:8/31/19  Diagnosis:Incomplete uterovaginal prolapse  (primary encounter diagnosis)  Cystocele and rectocele with incomplete uterovaginal prolapse  Rectocele  Maria Luisa (stress urinary incontinence, female)  Procedure:TVH BS

## 2019-09-18 NOTE — OPERATIVE REPORT
[address]    PATIENT'S NAME: Lili Radha   ATTENDING PHYSICIAN: Shanon Reddy D.O.   OPERATING PHYSICIAN: Shanon Reddy D.O.   PATIENT ACCOUNT#:   [de-identified]    LOCATION:  [address] [place] [address]  MEDICAL RECORD #:   [id number]       DATE OF BIRTH:  0 pedicles were then suture ligated with excellent hemostasis. Fallopian tubes were then identified on both sides, excised, and pedicle suture ligated with good hemostasis.   Dr. Nati Dominguez then took over to perform the cystocele and rectocele repair with a sli

## 2019-09-24 ENCOUNTER — OFFICE VISIT (OUTPATIENT)
Dept: OBGYN CLINIC | Facility: CLINIC | Age: 54
End: 2019-09-24
Payer: COMMERCIAL

## 2019-09-24 VITALS
HEART RATE: 118 BPM | BODY MASS INDEX: 25.33 KG/M2 | SYSTOLIC BLOOD PRESSURE: 114 MMHG | WEIGHT: 152 LBS | DIASTOLIC BLOOD PRESSURE: 72 MMHG | HEIGHT: 65 IN

## 2019-09-24 DIAGNOSIS — Z09 POSTOP CHECK: Primary | ICD-10-CM

## 2019-09-24 PROCEDURE — 99024 POSTOP FOLLOW-UP VISIT: CPT | Performed by: OBSTETRICS & GYNECOLOGY

## 2019-09-24 NOTE — PROGRESS NOTES
Hortencia Nettles is a 53year old female [id number] Patient's last menstrual period was 08/02/2019 (exact date). Patient presents with: Follow - Up: Sx f/u Hyst., pt having pain due to hemorrhoids  .   Patient c/o pain in right perineal area for the past 2 days, Occupational History      Not on file    Social Needs      Financial resource strain: Not on file      Food insecurity:        Worry: Not on file        Inability: Not on file      Transportation needs:        Medical: Not on file        Non-medical: Not o Not on file      FAMILY HISTORY:  Family History   Problem Relation Age of Onset   • Heart Disease Father    • Other (acute MI) Father    • Other (hyperlipidemia) Father    • Heart Disease Mother    • Other (hyperlipidemia) Mother    • Heart Disease Matern with   Anus: no hemorroids     Assessment & Plan:  1.  Postop check  - healing well  - pelvic rest until cleared by

## 2019-09-27 ENCOUNTER — TELEPHONE (OUTPATIENT)
Dept: UROLOGY | Facility: HOSPITAL | Age: 54
End: 2019-09-27

## 2019-09-27 NOTE — TELEPHONE ENCOUNTER
S/P SHAYAN, CONCEPCION (Oksana) with USLS, APER, MUS, cysto from 8-29-19. Has upcoming post op f/u with Dr. Marguerite Singh in 1.5 weeks. Pt is calling today with c/o discomfort in perineum and feeling \"a hard rock-like\" bump in perineum.   Pt reports it is difficult t

## 2019-09-30 NOTE — TELEPHONE ENCOUNTER
Spoke to Dr. Brooks Castillo with condition update, requests pt come to office for assessment 10-2-19. Called pt, pt reports feeling a \"little less intense\" discomfort. Pt to come in Wednesday for assessment, verbalizes understanding. appt scheduled.

## 2019-10-02 ENCOUNTER — OFFICE VISIT (OUTPATIENT)
Dept: UROLOGY | Facility: HOSPITAL | Age: 54
End: 2019-10-02
Attending: OBSTETRICS & GYNECOLOGY
Payer: COMMERCIAL

## 2019-10-02 VITALS
WEIGHT: 152 LBS | SYSTOLIC BLOOD PRESSURE: 114 MMHG | TEMPERATURE: 99 F | HEART RATE: 90 BPM | BODY MASS INDEX: 25.33 KG/M2 | RESPIRATION RATE: 16 BRPM | HEIGHT: 65 IN | DIASTOLIC BLOOD PRESSURE: 78 MMHG

## 2019-10-02 DIAGNOSIS — Z48.816 AFTERCARE FOLLOWING SURGERY OF THE GENITOURINARY SYSTEM: Primary | ICD-10-CM

## 2019-10-02 PROCEDURE — 17250 CHEM CAUT OF GRANLTJ TISSUE: CPT

## 2019-10-02 PROCEDURE — 99211 OFF/OP EST MAY X REQ PHY/QHP: CPT

## 2019-10-02 RX ORDER — ESTRADIOL 0.1 MG/G
CREAM VAGINAL
Qty: 1 TUBE | Refills: 3 | Status: SHIPPED | OUTPATIENT
Start: 2019-10-02 | End: 2021-07-14

## 2019-10-02 NOTE — PROGRESS NOTES
Shaniqua Trejo  5/9/1965  10/2/19    Patient presents with: Other: post op visit       HPI:  Gal Simmons is a 53year-old female who is status post TVH and BS by Dr. Nancy Anguiano, followed by USLS, A&P repair, enterocele repair, sling and cystoscopy on 8/29/19.  Surge test: Negative in the supine position. External genitalia: Normal for age. Perineum is well healed. Sling induration to the right of the midline. Non tender, no evidence of cellulitis or infection. Vagina: Normal introital diameter.   Incisions: Res

## 2019-11-06 ENCOUNTER — OFFICE VISIT (OUTPATIENT)
Dept: UROLOGY | Facility: HOSPITAL | Age: 54
End: 2019-11-06
Attending: OBSTETRICS & GYNECOLOGY
Payer: COMMERCIAL

## 2019-11-06 VITALS
HEIGHT: 65 IN | WEIGHT: 150 LBS | BODY MASS INDEX: 24.99 KG/M2 | DIASTOLIC BLOOD PRESSURE: 90 MMHG | SYSTOLIC BLOOD PRESSURE: 120 MMHG

## 2019-11-06 DIAGNOSIS — Z48.816 AFTERCARE FOLLOWING SURGERY OF THE GENITOURINARY SYSTEM: Primary | ICD-10-CM

## 2019-11-06 PROCEDURE — 17250 CHEM CAUT OF GRANLTJ TISSUE: CPT

## 2019-11-06 PROCEDURE — 99212 OFFICE O/P EST SF 10 MIN: CPT

## 2019-11-06 NOTE — PROGRESS NOTES
Hortencia Gaucher  5/9/1965 11/6/19    Patient presents with:  Surgical Followup: 08/29/19  USLS,APER,MUS,CYSTO  assist with Dr Yoandy Wallace and BS small yellow discharge, having trouble with sleep      HPI:  Nickolas Sotelo is a 53year-old female who is status post TV vaginal estrogen at this point. I would like to see her back a year from surgery (August 2020) or sooner prn. Shirley Hinds MD, [facility]  Female Pelvic Medicine and   Reconstructive Surgery    CC: Dr. Mervin Rocha.

## 2019-12-18 ENCOUNTER — OFFICE VISIT (OUTPATIENT)
Dept: FAMILY MEDICINE CLINIC | Facility: CLINIC | Age: 54
End: 2019-12-18
Payer: COMMERCIAL

## 2019-12-18 VITALS
SYSTOLIC BLOOD PRESSURE: 122 MMHG | DIASTOLIC BLOOD PRESSURE: 80 MMHG | HEART RATE: 89 BPM | TEMPERATURE: 99 F | WEIGHT: 150 LBS | BODY MASS INDEX: 25 KG/M2 | OXYGEN SATURATION: 99 %

## 2019-12-18 DIAGNOSIS — J01.00 ACUTE NON-RECURRENT MAXILLARY SINUSITIS: Primary | ICD-10-CM

## 2019-12-18 PROCEDURE — 99213 OFFICE O/P EST LOW 20 MIN: CPT | Performed by: NURSE PRACTITIONER

## 2019-12-18 RX ORDER — CEFDINIR 300 MG/1
300 CAPSULE ORAL 2 TIMES DAILY
Qty: 20 CAPSULE | Refills: 0 | Status: SHIPPED | OUTPATIENT
Start: 2019-12-18 | End: 2019-12-28

## 2019-12-18 RX ORDER — PREDNISONE 20 MG/1
40 TABLET ORAL DAILY
Qty: 10 TABLET | Refills: 0 | Status: SHIPPED | OUTPATIENT
Start: 2019-12-18 | End: 2019-12-23

## 2019-12-18 NOTE — PROGRESS NOTES
CHIEF COMPLAINT:   Patient presents with:  Cough/URI: sinus pressure, headaches, congested, x 6 days. HPI:   Mi Rose is a 53year old female who presents for cold symptoms for  6  months.  Symptoms have progressed into sinus congestion and been wo rhinitis    • Dizziness and giddiness    • H/O mammogram 10/13,10/12    negative   • Heart murmur     mitro valve prolapse   • Hernia     Hietal   • High cholesterol    • Pap smear for cervical cancer screening 12/14,09/13,07/11    negative      Past Surgi Location: Right arm, Patient Position: Sitting, Cuff Size: adult)   Pulse 89   Temp 99 °F (37.2 °C) (Oral)   Wt 150 lb (68 kg)   LMP 08/02/2019 (Exact Date)   SpO2 99%   BMI 24.96 kg/m²   GENERAL: well developed, well nourished,in no apparent distress  SKI patient indicates understanding of these issues and agrees to the plan. The patient is asked to return if sx's persist or worsen.

## 2020-01-09 ENCOUNTER — TELEPHONE (OUTPATIENT)
Dept: OBGYN CLINIC | Facility: CLINIC | Age: 55
End: 2020-01-09

## 2020-01-09 NOTE — TELEPHONE ENCOUNTER
Patient calling and states that she had a surgery in august for a  Hysterectomy, and has been having some irregular bleeding since November, pt would like to discuss with nurse further regarding her symptoms.  Please advise

## 2020-01-09 NOTE — TELEPHONE ENCOUNTER
Patient states that she had a hysterectomy in August and had some brownish discharge in November and then recently had had some  that was red.  Wears a panty liner so she says she is sure it is coming  from the vagina and not the rectum or the urinary tract

## 2020-01-16 ENCOUNTER — OFFICE VISIT (OUTPATIENT)
Dept: OBGYN CLINIC | Facility: CLINIC | Age: 55
End: 2020-01-16
Payer: COMMERCIAL

## 2020-01-16 VITALS
DIASTOLIC BLOOD PRESSURE: 72 MMHG | WEIGHT: 154 LBS | SYSTOLIC BLOOD PRESSURE: 112 MMHG | HEART RATE: 114 BPM | BODY MASS INDEX: 25.66 KG/M2 | HEIGHT: 65 IN

## 2020-01-16 DIAGNOSIS — N92.0 SPOTTING: Primary | ICD-10-CM

## 2020-01-16 NOTE — PROGRESS NOTES
Juan Boss is a 53year old female [id number] Patient's last menstrual period was 01/09/2020 (exact date). Patient presents with:  Gyn Problem: HYST in 08/2019, thurday pt had bright red blood, yellow discharge, slight pain during intercourse  .   Patient no Not on file    Social Needs      Financial resource strain: Not on file      Food insecurity:        Worry: Not on file        Inability: Not on file      Transportation needs:        Medical: Not on file        Non-medical: Not on file    Tobacco Use HISTORY:  Family History   Problem Relation Age of Onset   • Heart Disease Father    • Other (acute MI) Father    • Other (hyperlipidemia) Father    • Heart Disease Mother    • Other (hyperlipidemia) Mother    • Heart Disease Maternal Grandfather    • Canc

## 2020-07-07 ENCOUNTER — OFFICE VISIT (OUTPATIENT)
Dept: OBGYN CLINIC | Facility: CLINIC | Age: 55
End: 2020-07-07
Payer: COMMERCIAL

## 2020-07-07 VITALS
HEART RATE: 91 BPM | WEIGHT: 147 LBS | DIASTOLIC BLOOD PRESSURE: 82 MMHG | BODY MASS INDEX: 24.49 KG/M2 | HEIGHT: 65 IN | SYSTOLIC BLOOD PRESSURE: 124 MMHG

## 2020-07-07 DIAGNOSIS — Z12.31 BREAST CANCER SCREENING BY MAMMOGRAM: ICD-10-CM

## 2020-07-07 DIAGNOSIS — Z01.419 ENCOUNTER FOR WELL WOMAN EXAM WITH ROUTINE GYNECOLOGICAL EXAM: Primary | ICD-10-CM

## 2020-07-07 PROCEDURE — 99396 PREV VISIT EST AGE 40-64: CPT | Performed by: OBSTETRICS & GYNECOLOGY

## 2020-07-07 NOTE — PROGRESS NOTES
Yolande Gutierrez is a 63year old female [id number] Patient's last menstrual period was 2020 (exact date). Patient presents with:  Wellness Visit: pt requesting blood work  . patient has no complaints    OBSTETRICS HISTORY:  OB History    Para Term Pr resource strain: Not on file      Food insecurity:        Worry: Not on file        Inability: Not on file      Transportation needs:        Medical: Not on file        Non-medical: Not on file    Tobacco Use      Smoking status: Former Smoker        Types Age of Onset   • Heart Disease Father    • Other (acute MI) Father    • Other (hyperlipidemia) Father    • Heart Disease Mother    • Other (hyperlipidemia) Mother    • Heart Disease Maternal Grandfather    • Cancer Paternal Uncle         lung   • Heart Dis nourished  Head/Face: normocephalic  Neck/Thyroid: thyroid symmetric, no thyromegaly, no nodules, no adenopathy  Lymphatic:no abnormal supraclavicular or axillary adenopathy is noted  Breast: normal without palpable masses, tenderness, asymmetry, nipple di

## 2020-08-11 ENCOUNTER — HOSPITAL ENCOUNTER (OUTPATIENT)
Dept: MAMMOGRAPHY | Age: 55
Discharge: HOME | End: 2020-08-11
Attending: OBSTETRICS & GYNECOLOGY
Payer: COMMERCIAL

## 2020-08-11 ENCOUNTER — LAB ENCOUNTER (OUTPATIENT)
Dept: LAB | Age: 55
End: 2020-08-11
Attending: OBSTETRICS & GYNECOLOGY
Payer: COMMERCIAL

## 2020-08-11 DIAGNOSIS — Z01.419 ENCOUNTER FOR WELL WOMAN EXAM WITH ROUTINE GYNECOLOGICAL EXAM: ICD-10-CM

## 2020-08-11 DIAGNOSIS — Z12.31 BREAST CANCER SCREENING BY MAMMOGRAM: ICD-10-CM

## 2020-08-11 LAB
ALBUMIN SERPL-MCNC: 3.9 G/DL (ref 3.4–5)
ALBUMIN/GLOB SERPL: 1.1 {RATIO} (ref 1–2)
ALP LIVER SERPL-CCNC: 65 U/L (ref 46–118)
ALT SERPL-CCNC: 25 U/L (ref 13–56)
ANION GAP SERPL CALC-SCNC: 4 MMOL/L (ref 0–18)
AST SERPL-CCNC: 18 U/L (ref 15–37)
BASOPHILS # BLD AUTO: 0.06 X10(3) UL (ref 0–0.2)
BASOPHILS NFR BLD AUTO: 0.8 %
BILIRUB SERPL-MCNC: 0.5 MG/DL (ref 0.1–2)
BUN BLD-MCNC: 18 MG/DL (ref 7–18)
BUN/CREAT SERPL: 20.7 (ref 10–20)
CALCIUM BLD-MCNC: 9.2 MG/DL (ref 8.5–10.1)
CHLORIDE SERPL-SCNC: 107 MMOL/L (ref 98–112)
CHOLEST SMN-MCNC: 265 MG/DL (ref ?–200)
CO2 SERPL-SCNC: 30 MMOL/L (ref 21–32)
CREAT BLD-MCNC: 0.87 MG/DL (ref 0.55–1.02)
DEPRECATED RDW RBC AUTO: 48.1 FL (ref 35.1–46.3)
EOSINOPHIL # BLD AUTO: 0.25 X10(3) UL (ref 0–0.7)
EOSINOPHIL NFR BLD AUTO: 3.4 %
ERYTHROCYTE [DISTWIDTH] IN BLOOD BY AUTOMATED COUNT: 13.4 % (ref 11–15)
GLOBULIN PLAS-MCNC: 3.6 G/DL (ref 2.8–4.4)
GLUCOSE BLD-MCNC: 91 MG/DL (ref 70–99)
HCT VFR BLD AUTO: 41 % (ref 35–48)
HDLC SERPL-MCNC: 85 MG/DL (ref 40–59)
HGB BLD-MCNC: 12.9 G/DL (ref 12–16)
IMM GRANULOCYTES # BLD AUTO: 0.01 X10(3) UL (ref 0–1)
IMM GRANULOCYTES NFR BLD: 0.1 %
LDLC SERPL CALC-MCNC: 165 MG/DL (ref ?–100)
LYMPHOCYTES # BLD AUTO: 2.67 X10(3) UL (ref 1–4)
LYMPHOCYTES NFR BLD AUTO: 36.3 %
M PROTEIN MFR SERPL ELPH: 7.5 G/DL (ref 6.4–8.2)
MCH RBC QN AUTO: 30.2 PG (ref 26–34)
MCHC RBC AUTO-ENTMCNC: 31.5 G/DL (ref 31–37)
MCV RBC AUTO: 96 FL (ref 80–100)
MONOCYTES # BLD AUTO: 0.66 X10(3) UL (ref 0.1–1)
MONOCYTES NFR BLD AUTO: 9 %
NEUTROPHILS # BLD AUTO: 3.71 X10 (3) UL (ref 1.5–7.7)
NEUTROPHILS # BLD AUTO: 3.71 X10(3) UL (ref 1.5–7.7)
NEUTROPHILS NFR BLD AUTO: 50.4 %
NONHDLC SERPL-MCNC: 180 MG/DL (ref ?–130)
OSMOLALITY SERPL CALC.SUM OF ELEC: 293 MOSM/KG (ref 275–295)
PATIENT FASTING Y/N/NP: YES
PATIENT FASTING Y/N/NP: YES
PLATELET # BLD AUTO: 358 10(3)UL (ref 150–450)
POTASSIUM SERPL-SCNC: 4.5 MMOL/L (ref 3.5–5.1)
RBC # BLD AUTO: 4.27 X10(6)UL (ref 3.8–5.3)
SODIUM SERPL-SCNC: 141 MMOL/L (ref 136–145)
TRIGL SERPL-MCNC: 77 MG/DL (ref 30–149)
TSI SER-ACNC: 2.29 MIU/ML (ref 0.36–3.74)
VLDLC SERPL CALC-MCNC: 15 MG/DL (ref 0–30)
WBC # BLD AUTO: 7.4 X10(3) UL (ref 4–11)

## 2020-08-11 PROCEDURE — 80050 GENERAL HEALTH PANEL: CPT | Performed by: OBSTETRICS & GYNECOLOGY

## 2020-08-11 PROCEDURE — 77063 BREAST TOMOSYNTHESIS BI: CPT | Performed by: OBSTETRICS & GYNECOLOGY

## 2020-08-11 PROCEDURE — 80061 LIPID PANEL: CPT | Performed by: OBSTETRICS & GYNECOLOGY

## 2020-08-11 PROCEDURE — 77067 SCR MAMMO BI INCL CAD: CPT | Performed by: OBSTETRICS & GYNECOLOGY

## 2020-08-22 NOTE — PROGRESS NOTES
Radha Benavidez,  Your blood work results are normal except your cholesterol level. Please try low cholesterol diet and increase your physical activity. I encourage you to learn more about each test by accessing the great resources available through [employer].  Cl

## 2021-02-22 ENCOUNTER — TELEPHONE (OUTPATIENT)
Dept: INTERNAL MEDICINE CLINIC | Facility: CLINIC | Age: 56
End: 2021-02-22

## 2021-02-22 ENCOUNTER — APPOINTMENT (OUTPATIENT)
Dept: CT IMAGING | Age: 56
End: 2021-02-22
Attending: NURSE PRACTITIONER
Payer: COMMERCIAL

## 2021-02-22 ENCOUNTER — HOSPITAL ENCOUNTER (OUTPATIENT)
Age: 56
Discharge: HOME | End: 2021-02-22
Payer: COMMERCIAL

## 2021-02-22 VITALS
BODY MASS INDEX: 24.16 KG/M2 | HEIGHT: 65 IN | HEART RATE: 78 BPM | WEIGHT: 145 LBS | SYSTOLIC BLOOD PRESSURE: 128 MMHG | TEMPERATURE: 99 F | OXYGEN SATURATION: 98 % | DIASTOLIC BLOOD PRESSURE: 75 MMHG | RESPIRATION RATE: 18 BRPM

## 2021-02-22 DIAGNOSIS — S16.1XXA STRAIN OF NECK MUSCLE, INITIAL ENCOUNTER: ICD-10-CM

## 2021-02-22 DIAGNOSIS — S09.90XA CLOSED HEAD INJURY, INITIAL ENCOUNTER: Primary | ICD-10-CM

## 2021-02-22 PROCEDURE — 72125 CT NECK SPINE W/O DYE: CPT | Performed by: NURSE PRACTITIONER

## 2021-02-22 PROCEDURE — 99215 OFFICE O/P EST HI 40 MIN: CPT | Performed by: NURSE PRACTITIONER

## 2021-02-22 PROCEDURE — 70450 CT HEAD/BRAIN W/O DYE: CPT | Performed by: NURSE PRACTITIONER

## 2021-02-22 PROCEDURE — 99204 OFFICE O/P NEW MOD 45 MIN: CPT | Performed by: NURSE PRACTITIONER

## 2021-02-22 RX ORDER — CYCLOBENZAPRINE HCL 10 MG
10 TABLET ORAL 3 TIMES DAILY PRN
Qty: 20 TABLET | Refills: 0 | Status: SHIPPED | OUTPATIENT
Start: 2021-02-22 | End: 2021-03-01
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
## 2021-02-22 NOTE — TELEPHONE ENCOUNTER
Patient notified to go to the [facility] for evaluation for concussion protocol, may need imaging. Pt is currently having a bad headache. Pt verbalizes understanding. FYI to HUY.
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
## 2021-05-06 ENCOUNTER — TELEPHONE (OUTPATIENT)
Dept: OBGYN CLINIC | Facility: CLINIC | Age: 56
End: 2021-05-06

## 2021-07-14 ENCOUNTER — OFFICE VISIT (OUTPATIENT)
Dept: OBGYN CLINIC | Facility: CLINIC | Age: 56
End: 2021-07-14
Payer: COMMERCIAL

## 2021-07-14 VITALS
BODY MASS INDEX: 25.16 KG/M2 | DIASTOLIC BLOOD PRESSURE: 70 MMHG | HEART RATE: 90 BPM | HEIGHT: 65 IN | WEIGHT: 151 LBS | SYSTOLIC BLOOD PRESSURE: 116 MMHG

## 2021-07-14 DIAGNOSIS — Z12.31 BREAST CANCER SCREENING BY MAMMOGRAM: ICD-10-CM

## 2021-07-14 DIAGNOSIS — Z01.419 ENCOUNTER FOR WELL WOMAN EXAM WITH ROUTINE GYNECOLOGICAL EXAM: Primary | ICD-10-CM

## 2021-07-14 PROCEDURE — 3074F SYST BP LT 130 MM HG: CPT | Performed by: OBSTETRICS & GYNECOLOGY

## 2021-07-14 PROCEDURE — 99396 PREV VISIT EST AGE 40-64: CPT | Performed by: OBSTETRICS & GYNECOLOGY

## 2021-07-14 PROCEDURE — 3078F DIAST BP <80 MM HG: CPT | Performed by: OBSTETRICS & GYNECOLOGY

## 2021-07-14 PROCEDURE — 3008F BODY MASS INDEX DOCD: CPT | Performed by: OBSTETRICS & GYNECOLOGY

## 2021-07-14 RX ORDER — ESTRADIOL 0.1 MG/G
CREAM VAGINAL
Qty: 42.5 G | Refills: 0 | Status: SHIPPED | OUTPATIENT
Start: 2021-07-14 | End: 2021-07-28

## 2021-07-14 NOTE — PROGRESS NOTES
Dion Fulton is a 61year old female [id number] Patient's last menstrual period was 01/09/2020 (exact date).  Patient presents with:  Wellness Visit: refill needed on estradiol vaginal cream   .Patient has no complaints    OBSTETRICS HISTORY:  OB History   Gra name: Not on file      Number of children: Not on file      Years of education: Not on file      Highest education level: Not on file    Occupational History      Not on file    Tobacco Use      Smoking status: Former Smoker        Types: Cigarettes Status:   Intimate Partner Violence:       Fear of Current or Ex-Partner:       Emotionally Abused:       Physically Abused:       Sexually Abused:     FAMILY HISTORY:  Family History   Problem Relation Age of Onset   • Heart Disease Father    • Other (acu anemia, easy bruising or bleeding.       PHYSICAL EXAM:   Constitutional: well developed, well nourished  Head/Face: normocephalic  Neck/Thyroid: thyroid symmetric, no thyromegaly, no nodules, no adenopathy  Lymphatic:no abnormal supraclavicular or axillary

## 2021-07-28 RX ORDER — ESTRADIOL 0.1 MG/G
CREAM VAGINAL
Qty: 42.5 G | Refills: 3 | Status: SHIPPED | OUTPATIENT
Start: 2021-07-28 | End: 2021-08-09

## 2021-07-28 NOTE — TELEPHONE ENCOUNTER
Pt called to speak to a nurse about refill of a cream. Pt states the pharmacy has been trying to reach the doctor. Please advise.

## 2021-07-28 NOTE — TELEPHONE ENCOUNTER
Spoke with patient, medication was not prescribed with refills when she was at her [address] on 7/14/21. Advised that new script with refills would be routed to provider for signature. Patient verbalized understanding.  Order placed and routed to provider for sig

## 2021-08-09 ENCOUNTER — TELEPHONE (OUTPATIENT)
Dept: OBGYN CLINIC | Facility: CLINIC | Age: 56
End: 2021-08-09

## 2021-08-09 RX ORDER — ESTRADIOL 0.1 MG/G
CREAM VAGINAL
Qty: 42.5 G | Refills: 3 | Status: SHIPPED | OUTPATIENT
Start: 2021-08-09

## 2021-08-09 NOTE — TELEPHONE ENCOUNTER
Pt states we have given her incorrect med rx. Per pt, Dr. Afia Villalobos called in most recent prescription of estradiol, (CS-estradiol 0.4mg/gm). It is a device where she does one click to get 1/4 gm which she applies 3 times weekly.  Was told by pharmacy that wh

## 2021-08-09 NOTE — TELEPHONE ENCOUNTER
Pt called to speak to a nurse about her medication that was sent in to the pharmacy on 7/28. Pt states the script was    Estradiol 0.1 MG/GM Vaginal Cream    But Pt states it should be .4mg and should say apply 3x a week. Please advise.

## 2021-08-16 ENCOUNTER — LAB ENCOUNTER (OUTPATIENT)
Dept: LAB | Age: 56
End: 2021-08-16
Attending: OBSTETRICS & GYNECOLOGY
Payer: COMMERCIAL

## 2021-08-16 DIAGNOSIS — Z01.419 ENCOUNTER FOR WELL WOMAN EXAM WITH ROUTINE GYNECOLOGICAL EXAM: ICD-10-CM

## 2021-08-16 LAB
ALBUMIN SERPL-MCNC: 4.2 G/DL (ref 3.4–5)
ALBUMIN/GLOB SERPL: 1.2 {RATIO} (ref 1–2)
ALP LIVER SERPL-CCNC: 66 U/L
ALT SERPL-CCNC: 36 U/L
ANION GAP SERPL CALC-SCNC: 5 MMOL/L (ref 0–18)
AST SERPL-CCNC: 23 U/L (ref 15–37)
BASOPHILS # BLD AUTO: 0.07 X10(3) UL (ref 0–0.2)
BASOPHILS NFR BLD AUTO: 1 %
BILIRUB SERPL-MCNC: 0.6 MG/DL (ref 0.1–2)
BUN BLD-MCNC: 11 MG/DL (ref 7–18)
CALCIUM BLD-MCNC: 9.1 MG/DL (ref 8.5–10.1)
CHLORIDE SERPL-SCNC: 106 MMOL/L (ref 98–112)
CHOLEST SMN-MCNC: 312 MG/DL (ref ?–200)
CO2 SERPL-SCNC: 27 MMOL/L (ref 21–32)
CREAT BLD-MCNC: 0.97 MG/DL
EOSINOPHIL # BLD AUTO: 0.27 X10(3) UL (ref 0–0.7)
EOSINOPHIL NFR BLD AUTO: 4 %
ERYTHROCYTE [DISTWIDTH] IN BLOOD BY AUTOMATED COUNT: 13.8 %
GLOBULIN PLAS-MCNC: 3.4 G/DL (ref 2.8–4.4)
GLUCOSE BLD-MCNC: 84 MG/DL (ref 70–99)
HCT VFR BLD AUTO: 41.9 %
HDLC SERPL-MCNC: 99 MG/DL (ref 40–59)
HGB BLD-MCNC: 13.5 G/DL
IMM GRANULOCYTES # BLD AUTO: 0.02 X10(3) UL (ref 0–1)
IMM GRANULOCYTES NFR BLD: 0.3 %
LDLC SERPL CALC-MCNC: 205 MG/DL (ref ?–100)
LYMPHOCYTES # BLD AUTO: 2.09 X10(3) UL (ref 1–4)
LYMPHOCYTES NFR BLD AUTO: 31.2 %
M PROTEIN MFR SERPL ELPH: 7.6 G/DL (ref 6.4–8.2)
MCH RBC QN AUTO: 30.9 PG (ref 26–34)
MCHC RBC AUTO-ENTMCNC: 32.2 G/DL (ref 31–37)
MCV RBC AUTO: 95.9 FL
MONOCYTES # BLD AUTO: 0.59 X10(3) UL (ref 0.1–1)
MONOCYTES NFR BLD AUTO: 8.8 %
NEUTROPHILS # BLD AUTO: 3.65 X10 (3) UL (ref 1.5–7.7)
NEUTROPHILS # BLD AUTO: 3.65 X10(3) UL (ref 1.5–7.7)
NEUTROPHILS NFR BLD AUTO: 54.7 %
NONHDLC SERPL-MCNC: 213 MG/DL (ref ?–130)
OSMOLALITY SERPL CALC.SUM OF ELEC: 285 MOSM/KG (ref 275–295)
PATIENT FASTING Y/N/NP: YES
PATIENT FASTING Y/N/NP: YES
PLATELET # BLD AUTO: 345 10(3)UL (ref 150–450)
POTASSIUM SERPL-SCNC: 3.9 MMOL/L (ref 3.5–5.1)
RBC # BLD AUTO: 4.37 X10(6)UL
SODIUM SERPL-SCNC: 138 MMOL/L (ref 136–145)
TRIGL SERPL-MCNC: 61 MG/DL (ref 30–149)
TSI SER-ACNC: 3.04 MIU/ML (ref 0.36–3.74)
VLDLC SERPL CALC-MCNC: 13 MG/DL (ref 0–30)
WBC # BLD AUTO: 6.7 X10(3) UL (ref 4–11)

## 2021-08-16 PROCEDURE — 80061 LIPID PANEL: CPT | Performed by: OBSTETRICS & GYNECOLOGY

## 2021-08-16 PROCEDURE — 80050 GENERAL HEALTH PANEL: CPT | Performed by: OBSTETRICS & GYNECOLOGY

## 2021-08-24 NOTE — PROGRESS NOTES
Patient aware of blood test results and recommendation to follow up with PCP regarding elevated Cholesterol. Patient verbalizes understanding.

## 2022-02-11 ENCOUNTER — TELEPHONE (OUTPATIENT)
Dept: OBGYN CLINIC | Facility: CLINIC | Age: 57
End: 2022-02-11

## 2022-02-11 NOTE — TELEPHONE ENCOUNTER
Pt was on abx for pneumonia and bronchitis for 14 days, ended treatment last week. Started feeling vaginal itching and started monistat 3 this past week with no relief. Per pt, has yellow discharge with bad odor. Pt scheduled for appt Monday, advised it could be BV, can try boric acid vaginal suppositories OTC and rephresh externally. Understanding verbalized.

## 2022-02-11 NOTE — TELEPHONE ENCOUNTER
Pt called back to state she can not make the morning appointment she scheduled for Monday. Pt reschedule for afternoon with Dr. Milady Horn in an open spot.

## 2022-02-11 NOTE — TELEPHONE ENCOUNTER
Pt calling to speak to a nurse about a yeast infection she thinks she is experiencing. Pt states she tried the over the counter medication and it is not helping. Pt states she believes a very strong antibiotic is what caused this. Please advise.

## 2022-05-18 ENCOUNTER — TELEPHONE (OUTPATIENT)
Dept: INTERNAL MEDICINE CLINIC | Facility: CLINIC | Age: 57
End: 2022-05-18

## 2022-05-18 DIAGNOSIS — Z13.0 SCREENING FOR BLOOD DISEASE: ICD-10-CM

## 2022-05-18 DIAGNOSIS — Z13.228 SCREENING FOR METABOLIC DISORDER: ICD-10-CM

## 2022-05-18 DIAGNOSIS — Z13.29 SCREENING FOR THYROID DISORDER: ICD-10-CM

## 2022-05-18 DIAGNOSIS — Z13.220 SCREENING FOR LIPID DISORDERS: ICD-10-CM

## 2022-05-18 DIAGNOSIS — Z00.00 ROUTINE GENERAL MEDICAL EXAMINATION AT A HEALTH CARE FACILITY: Primary | ICD-10-CM

## 2022-05-18 NOTE — TELEPHONE ENCOUNTER
Future Appointments   Date Time Provider Tino Gaspar   8/1/2022 11:00 AM Aashish Pineda MD [facility]     Orders to edward- Pt informed that labs need to be completed no sooner than 2 weeks prior to the appt.  Pt aware to fast-no call back required

## 2022-05-19 ENCOUNTER — HOSPITAL ENCOUNTER (OUTPATIENT)
Dept: MAMMOGRAPHY | Age: 57
Discharge: HOME | End: 2022-05-19
Attending: OBSTETRICS & GYNECOLOGY
Payer: COMMERCIAL

## 2022-05-19 DIAGNOSIS — Z12.31 BREAST CANCER SCREENING BY MAMMOGRAM: ICD-10-CM

## 2022-05-19 PROCEDURE — 77063 BREAST TOMOSYNTHESIS BI: CPT | Performed by: OBSTETRICS & GYNECOLOGY

## 2022-05-19 PROCEDURE — 77067 SCR MAMMO BI INCL CAD: CPT | Performed by: OBSTETRICS & GYNECOLOGY

## 2022-07-15 ENCOUNTER — OFFICE VISIT (OUTPATIENT)
Dept: FAMILY MEDICINE CLINIC | Facility: CLINIC | Age: 57
End: 2022-07-15
Payer: COMMERCIAL

## 2022-07-15 VITALS
SYSTOLIC BLOOD PRESSURE: 125 MMHG | RESPIRATION RATE: 18 BRPM | TEMPERATURE: 98 F | OXYGEN SATURATION: 99 % | HEART RATE: 96 BPM | BODY MASS INDEX: 24.99 KG/M2 | DIASTOLIC BLOOD PRESSURE: 80 MMHG | HEIGHT: 65 IN | WEIGHT: 150 LBS

## 2022-07-15 DIAGNOSIS — B97.89 VIRAL SINUSITIS: ICD-10-CM

## 2022-07-15 DIAGNOSIS — J06.9 VIRAL UPPER RESPIRATORY ILLNESS: ICD-10-CM

## 2022-07-15 DIAGNOSIS — J02.9 SORE THROAT: Primary | ICD-10-CM

## 2022-07-15 DIAGNOSIS — J32.9 VIRAL SINUSITIS: ICD-10-CM

## 2022-07-15 LAB
CONTROL LINE PRESENT WITH A CLEAR BACKGROUND (YES/NO): YES YES/NO
STREP GRP A CUL-SCR: NEGATIVE

## 2022-07-15 PROCEDURE — 3079F DIAST BP 80-89 MM HG: CPT | Performed by: NURSE PRACTITIONER

## 2022-07-15 PROCEDURE — 3008F BODY MASS INDEX DOCD: CPT | Performed by: NURSE PRACTITIONER

## 2022-07-15 PROCEDURE — 99213 OFFICE O/P EST LOW 20 MIN: CPT | Performed by: NURSE PRACTITIONER

## 2022-07-15 PROCEDURE — 3074F SYST BP LT 130 MM HG: CPT | Performed by: NURSE PRACTITIONER

## 2022-07-15 PROCEDURE — 87880 STREP A ASSAY W/OPTIC: CPT | Performed by: NURSE PRACTITIONER

## 2022-07-16 LAB — SARS-COV-2 RNA RESP QL NAA+PROBE: DETECTED

## 2022-07-27 ENCOUNTER — LAB ENCOUNTER (OUTPATIENT)
Dept: LAB | Age: 57
End: 2022-07-27
Attending: INTERNAL MEDICINE
Payer: COMMERCIAL

## 2022-07-27 DIAGNOSIS — Z13.0 SCREENING FOR BLOOD DISEASE: ICD-10-CM

## 2022-07-27 DIAGNOSIS — Z13.29 SCREENING FOR THYROID DISORDER: ICD-10-CM

## 2022-07-27 DIAGNOSIS — Z13.220 SCREENING FOR LIPID DISORDERS: ICD-10-CM

## 2022-07-27 DIAGNOSIS — Z13.228 SCREENING FOR METABOLIC DISORDER: ICD-10-CM

## 2022-07-27 DIAGNOSIS — Z00.00 ROUTINE GENERAL MEDICAL EXAMINATION AT A HEALTH CARE FACILITY: ICD-10-CM

## 2022-07-27 LAB
ALBUMIN SERPL-MCNC: 4.1 G/DL (ref 3.4–5)
ALBUMIN/GLOB SERPL: 1.1 {RATIO} (ref 1–2)
ALP LIVER SERPL-CCNC: 72 U/L
ALT SERPL-CCNC: 33 U/L
ANION GAP SERPL CALC-SCNC: 6 MMOL/L (ref 0–18)
AST SERPL-CCNC: 29 U/L (ref 15–37)
BASOPHILS # BLD AUTO: 0.06 X10(3) UL (ref 0–0.2)
BASOPHILS NFR BLD AUTO: 0.7 %
BILIRUB SERPL-MCNC: 0.7 MG/DL (ref 0.1–2)
BUN BLD-MCNC: 12 MG/DL (ref 7–18)
CALCIUM BLD-MCNC: 9.3 MG/DL (ref 8.5–10.1)
CHLORIDE SERPL-SCNC: 106 MMOL/L (ref 98–112)
CHOLEST SERPL-MCNC: 288 MG/DL (ref ?–200)
CO2 SERPL-SCNC: 26 MMOL/L (ref 21–32)
CREAT BLD-MCNC: 0.86 MG/DL
EOSINOPHIL # BLD AUTO: 0.2 X10(3) UL (ref 0–0.7)
EOSINOPHIL NFR BLD AUTO: 2.5 %
ERYTHROCYTE [DISTWIDTH] IN BLOOD BY AUTOMATED COUNT: 13.8 %
FASTING PATIENT LIPID ANSWER: YES
FASTING STATUS PATIENT QL REPORTED: YES
GLOBULIN PLAS-MCNC: 3.6 G/DL (ref 2.8–4.4)
GLUCOSE BLD-MCNC: 84 MG/DL (ref 70–99)
HCT VFR BLD AUTO: 42.6 %
HDLC SERPL-MCNC: 77 MG/DL (ref 40–59)
HGB BLD-MCNC: 13.6 G/DL
IMM GRANULOCYTES # BLD AUTO: 0.03 X10(3) UL (ref 0–1)
IMM GRANULOCYTES NFR BLD: 0.4 %
LDLC SERPL CALC-MCNC: 189 MG/DL (ref ?–100)
LYMPHOCYTES # BLD AUTO: 2.56 X10(3) UL (ref 1–4)
LYMPHOCYTES NFR BLD AUTO: 31.7 %
MCH RBC QN AUTO: 31 PG (ref 26–34)
MCHC RBC AUTO-ENTMCNC: 31.9 G/DL (ref 31–37)
MCV RBC AUTO: 97 FL
MONOCYTES # BLD AUTO: 0.72 X10(3) UL (ref 0.1–1)
MONOCYTES NFR BLD AUTO: 8.9 %
NEUTROPHILS # BLD AUTO: 4.51 X10 (3) UL (ref 1.5–7.7)
NEUTROPHILS # BLD AUTO: 4.51 X10(3) UL (ref 1.5–7.7)
NEUTROPHILS NFR BLD AUTO: 55.8 %
NONHDLC SERPL-MCNC: 211 MG/DL (ref ?–130)
OSMOLALITY SERPL CALC.SUM OF ELEC: 285 MOSM/KG (ref 275–295)
PLATELET # BLD AUTO: 396 10(3)UL (ref 150–450)
POTASSIUM SERPL-SCNC: 3.7 MMOL/L (ref 3.5–5.1)
PROT SERPL-MCNC: 7.7 G/DL (ref 6.4–8.2)
RBC # BLD AUTO: 4.39 X10(6)UL
SODIUM SERPL-SCNC: 138 MMOL/L (ref 136–145)
TRIGL SERPL-MCNC: 125 MG/DL (ref 30–149)
TSI SER-ACNC: 1.57 MIU/ML (ref 0.36–3.74)
VLDLC SERPL CALC-MCNC: 26 MG/DL (ref 0–30)
WBC # BLD AUTO: 8.1 X10(3) UL (ref 4–11)

## 2022-07-27 PROCEDURE — 80061 LIPID PANEL: CPT | Performed by: INTERNAL MEDICINE

## 2022-07-27 PROCEDURE — 80050 GENERAL HEALTH PANEL: CPT | Performed by: INTERNAL MEDICINE

## 2022-08-01 ENCOUNTER — OFFICE VISIT (OUTPATIENT)
Dept: INTERNAL MEDICINE CLINIC | Facility: CLINIC | Age: 57
End: 2022-08-01
Payer: COMMERCIAL

## 2022-08-01 VITALS
BODY MASS INDEX: 25.05 KG/M2 | SYSTOLIC BLOOD PRESSURE: 126 MMHG | DIASTOLIC BLOOD PRESSURE: 74 MMHG | HEART RATE: 90 BPM | HEIGHT: 64.96 IN | RESPIRATION RATE: 16 BRPM | WEIGHT: 150.38 LBS | TEMPERATURE: 97 F | OXYGEN SATURATION: 97 %

## 2022-08-01 DIAGNOSIS — G47.9 SLEEP DISTURBANCE: ICD-10-CM

## 2022-08-01 DIAGNOSIS — F41.9 ANXIETY: ICD-10-CM

## 2022-08-01 DIAGNOSIS — Z00.00 ROUTINE GENERAL MEDICAL EXAMINATION AT A HEALTH CARE FACILITY: Primary | ICD-10-CM

## 2022-08-01 DIAGNOSIS — E78.00 HIGH CHOLESTEROL: ICD-10-CM

## 2022-08-01 DIAGNOSIS — Z12.11 ENCOUNTER FOR SCREENING COLONOSCOPY: ICD-10-CM

## 2022-08-01 DIAGNOSIS — Z86.16 PERSONAL HISTORY OF COVID-19: ICD-10-CM

## 2022-08-01 PROCEDURE — 3008F BODY MASS INDEX DOCD: CPT | Performed by: INTERNAL MEDICINE

## 2022-08-01 PROCEDURE — 3078F DIAST BP <80 MM HG: CPT | Performed by: INTERNAL MEDICINE

## 2022-08-01 PROCEDURE — 3074F SYST BP LT 130 MM HG: CPT | Performed by: INTERNAL MEDICINE

## 2022-08-01 PROCEDURE — 99396 PREV VISIT EST AGE 40-64: CPT | Performed by: INTERNAL MEDICINE

## 2022-08-01 RX ORDER — ESCITALOPRAM OXALATE 5 MG/1
5 TABLET ORAL DAILY
Qty: 30 TABLET | Refills: 1 | Status: SHIPPED | OUTPATIENT
Start: 2022-08-01

## 2022-08-17 RX ORDER — ESTRADIOL 0.1 MG/G
CREAM VAGINAL
Qty: 42.5 G | Refills: 3 | OUTPATIENT
Start: 2022-08-17

## 2022-08-23 RX ORDER — ESTRADIOL 0.1 MG/G
1 CREAM VAGINAL
Qty: 1 EACH | Refills: 0 | Status: SHIPPED | OUTPATIENT
Start: 2022-08-24 | End: 2022-12-01

## 2022-09-28 ENCOUNTER — TELEPHONE (OUTPATIENT)
Dept: INTERNAL MEDICINE CLINIC | Facility: CLINIC | Age: 57
End: 2022-09-28

## 2022-09-28 RX ORDER — ESCITALOPRAM OXALATE 5 MG/1
5 TABLET ORAL DAILY
Qty: 30 TABLET | Refills: 1 | Status: SHIPPED | OUTPATIENT
Start: 2022-09-28

## 2022-09-28 NOTE — TELEPHONE ENCOUNTER
Sent refill to:    [address], [facility], [facility] [address]   Phone: [phone] Fax: [phone]

## 2022-09-28 NOTE — TELEPHONE ENCOUNTER
Patient is giving na update on the lexapro  Been on it since 8/1 helped with the anxiety some and sleep issues increased by 2 hours night  Patient has no more refills but would like to continue on it    Pharmacy listed is [facility] [address], [address], [phone]

## 2022-10-18 ENCOUNTER — OFFICE VISIT (OUTPATIENT)
Dept: OBGYN CLINIC | Facility: CLINIC | Age: 57
End: 2022-10-18
Payer: COMMERCIAL

## 2022-10-18 VITALS
DIASTOLIC BLOOD PRESSURE: 60 MMHG | HEIGHT: 65 IN | WEIGHT: 151 LBS | HEART RATE: 87 BPM | SYSTOLIC BLOOD PRESSURE: 114 MMHG | BODY MASS INDEX: 25.16 KG/M2

## 2022-10-18 DIAGNOSIS — Z01.419 ENCOUNTER FOR WELL WOMAN EXAM WITH ROUTINE GYNECOLOGICAL EXAM: Primary | ICD-10-CM

## 2022-10-18 PROCEDURE — 3074F SYST BP LT 130 MM HG: CPT | Performed by: OBSTETRICS & GYNECOLOGY

## 2022-10-18 PROCEDURE — 99396 PREV VISIT EST AGE 40-64: CPT | Performed by: OBSTETRICS & GYNECOLOGY

## 2022-10-18 PROCEDURE — 3078F DIAST BP <80 MM HG: CPT | Performed by: OBSTETRICS & GYNECOLOGY

## 2022-10-18 PROCEDURE — 3008F BODY MASS INDEX DOCD: CPT | Performed by: OBSTETRICS & GYNECOLOGY

## 2022-10-18 RX ORDER — ESTRADIOL 0.1 MG/G
1 CREAM VAGINAL
Qty: 42.5 G | Refills: 2 | Status: SHIPPED | OUTPATIENT
Start: 2022-10-18

## 2022-11-09 ENCOUNTER — HOSPITAL ENCOUNTER (OUTPATIENT)
Age: 57
Discharge: HOME | End: 2022-11-09
Payer: COMMERCIAL

## 2022-11-09 VITALS
BODY MASS INDEX: 24.16 KG/M2 | TEMPERATURE: 98 F | HEIGHT: 65 IN | HEART RATE: 114 BPM | OXYGEN SATURATION: 97 % | WEIGHT: 145 LBS | SYSTOLIC BLOOD PRESSURE: 143 MMHG | RESPIRATION RATE: 18 BRPM | DIASTOLIC BLOOD PRESSURE: 78 MMHG

## 2022-11-09 DIAGNOSIS — J01.90 ACUTE SINUSITIS, RECURRENCE NOT SPECIFIED, UNSPECIFIED LOCATION: Primary | ICD-10-CM

## 2022-11-09 LAB
POCT INFLUENZA A: NEGATIVE
POCT INFLUENZA B: NEGATIVE

## 2022-11-09 PROCEDURE — 99213 OFFICE O/P EST LOW 20 MIN: CPT

## 2022-11-09 PROCEDURE — 87502 INFLUENZA DNA AMP PROBE: CPT | Performed by: NURSE PRACTITIONER

## 2022-11-09 RX ORDER — PREDNISONE 20 MG/1
40 TABLET ORAL DAILY
Qty: 10 TABLET | Refills: 0 | Status: SHIPPED | OUTPATIENT
Start: 2022-11-09 | End: 2022-11-14

## 2022-11-09 RX ORDER — AMOXICILLIN AND CLAVULANATE POTASSIUM 875; 125 MG/1; MG/1
1 TABLET, FILM COATED ORAL 2 TIMES DAILY
Qty: 20 TABLET | Refills: 0 | Status: SHIPPED | OUTPATIENT
Start: 2022-11-09 | End: 2022-11-16

## 2022-11-09 NOTE — ED INITIAL ASSESSMENT (HPI)
C/o sinus pressure/congestion, post nasal drip since last week. Cough and chest congestion since Saturday. Treating with nebulizer with some relief. Fever on Sunday. Clear nasal discharges and coughing-up yellowish discharges. Home kit Covid test negative on Friday and Monday.

## 2022-11-16 ENCOUNTER — HOSPITAL ENCOUNTER (OUTPATIENT)
Dept: GENERAL RADIOLOGY | Age: 57
Discharge: HOME | End: 2022-11-16
Attending: NURSE PRACTITIONER
Payer: COMMERCIAL

## 2022-11-16 ENCOUNTER — OFFICE VISIT (OUTPATIENT)
Dept: INTERNAL MEDICINE CLINIC | Facility: CLINIC | Age: 57
End: 2022-11-16
Payer: COMMERCIAL

## 2022-11-16 VITALS
HEART RATE: 92 BPM | TEMPERATURE: 98 F | OXYGEN SATURATION: 97 % | BODY MASS INDEX: 24.83 KG/M2 | SYSTOLIC BLOOD PRESSURE: 112 MMHG | DIASTOLIC BLOOD PRESSURE: 76 MMHG | HEIGHT: 65 IN | WEIGHT: 149 LBS

## 2022-11-16 DIAGNOSIS — J40 SINOBRONCHITIS: Primary | ICD-10-CM

## 2022-11-16 DIAGNOSIS — J32.9 SINOBRONCHITIS: ICD-10-CM

## 2022-11-16 DIAGNOSIS — J40 SINOBRONCHITIS: ICD-10-CM

## 2022-11-16 DIAGNOSIS — J32.9 SINOBRONCHITIS: Primary | ICD-10-CM

## 2022-11-16 DIAGNOSIS — R05.1 ACUTE COUGH: ICD-10-CM

## 2022-11-16 PROCEDURE — 71046 X-RAY EXAM CHEST 2 VIEWS: CPT | Performed by: NURSE PRACTITIONER

## 2022-11-16 PROCEDURE — 99213 OFFICE O/P EST LOW 20 MIN: CPT | Performed by: NURSE PRACTITIONER

## 2022-11-16 PROCEDURE — 3008F BODY MASS INDEX DOCD: CPT | Performed by: NURSE PRACTITIONER

## 2022-11-16 PROCEDURE — 3074F SYST BP LT 130 MM HG: CPT | Performed by: NURSE PRACTITIONER

## 2022-11-16 PROCEDURE — 3078F DIAST BP <80 MM HG: CPT | Performed by: NURSE PRACTITIONER

## 2022-11-16 RX ORDER — POLYETHYLENE GLYCOL-3350 AND ELECTROLYTES WITH FLAVOR PACK 240; 5.84; 2.98; 6.72; 22.72 G/278.26G; G/278.26G; G/278.26G; G/278.26G; G/278.26G
POWDER, FOR SOLUTION ORAL
COMMUNITY
Start: 2022-08-18

## 2022-11-16 RX ORDER — CEFDINIR 300 MG/1
300 CAPSULE ORAL 2 TIMES DAILY
Qty: 20 CAPSULE | Refills: 0 | Status: SHIPPED | OUTPATIENT
Start: 2022-11-16

## 2022-11-16 RX ORDER — PREDNISONE 20 MG/1
20 TABLET ORAL DAILY
Qty: 10 TABLET | Refills: 0 | Status: SHIPPED | OUTPATIENT
Start: 2022-11-16 | End: 2022-11-26

## 2022-11-16 RX ORDER — CODEINE PHOSPHATE AND GUAIFENESIN 10; 100 MG/5ML; MG/5ML
10 SOLUTION ORAL NIGHTLY PRN
Qty: 240 ML | Refills: 0 | Status: SHIPPED | OUTPATIENT
Start: 2022-11-16

## 2022-11-18 ENCOUNTER — ORDER TRANSCRIPTION (OUTPATIENT)
Dept: ADMINISTRATIVE | Facility: HOSPITAL | Age: 57
End: 2022-11-18

## 2022-11-18 DIAGNOSIS — Z01.818 PRE-PROCEDURAL EXAMINATION: Primary | ICD-10-CM

## 2022-11-18 DIAGNOSIS — Z11.59 SCREENING FOR VIRAL DISEASE: ICD-10-CM

## 2022-11-26 ENCOUNTER — LAB ENCOUNTER (OUTPATIENT)
Dept: LAB | Facility: HOSPITAL | Age: 57
End: 2022-11-26
Attending: INTERNAL MEDICINE
Payer: COMMERCIAL

## 2022-11-26 DIAGNOSIS — Z11.59 SCREENING FOR VIRAL DISEASE: ICD-10-CM

## 2022-11-26 DIAGNOSIS — Z01.818 PRE-PROCEDURAL EXAMINATION: ICD-10-CM

## 2022-11-26 DIAGNOSIS — Z01.818 PRE-OP TESTING: ICD-10-CM

## 2022-11-27 LAB — SARS-COV-2 RNA RESP QL NAA+PROBE: NOT DETECTED

## 2022-11-28 PROBLEM — Z83.719 FAMILY HX COLONIC POLYPS: Status: ACTIVE | Noted: 2022-11-28

## 2022-11-28 PROBLEM — Z83.719 FAMILY HISTORY OF COLONIC POLYPS: Status: ACTIVE | Noted: 2022-11-28

## 2022-11-28 PROBLEM — Q40.1 HERNIA, HIATAL, CONGENITAL: Status: ACTIVE | Noted: 2022-11-28

## 2022-11-28 PROBLEM — D12.5 BENIGN NEOPLASM OF SIGMOID COLON: Status: ACTIVE | Noted: 2022-11-28

## 2022-11-28 PROBLEM — Z83.71 FAMILY HX COLONIC POLYPS: Status: ACTIVE | Noted: 2022-11-28

## 2022-11-28 PROBLEM — Z12.11 SPECIAL SCREENING FOR MALIGNANT NEOPLASM OF COLON: Status: ACTIVE | Noted: 2022-11-28

## 2022-11-28 PROBLEM — Z83.71 FAMILY HISTORY OF COLONIC POLYPS: Status: ACTIVE | Noted: 2022-11-28

## 2022-11-28 PROBLEM — R12 CHRONIC HEARTBURN: Status: ACTIVE | Noted: 2022-11-28

## 2022-11-28 PROBLEM — R10.13 ABDOMINAL PAIN, EPIGASTRIC: Status: ACTIVE | Noted: 2022-11-28

## 2022-11-28 PROBLEM — D12.2 BENIGN NEOPLASM OF ASCENDING COLON: Status: ACTIVE | Noted: 2022-11-28

## 2023-05-15 NOTE — PROGRESS NOTES
Continue home escitalopram   Mi Rose is a 47year old female. Patient presents with:  Pain: The ear pain has been offf and on for a whil      HPI:     C/o pain in both ears, cheeks, head intermittently for 2+ weeks. +cough and occasional wheezing.  Prone to sinus problems w n ith Relation Age of Onset   • acute MI[other] [OTHER] Father    • hyperlipidemia[other] [OTHER] Father    • Heart Disease Father    • hyperlipidemia[other] [OTHER] Mother    • Heart Disease Mother    • Heart Disease Maternal Grandfather    • Stroke Neg    • Ca for this visit. The patient indicates understanding of these issues and agrees to the plan.

## 2023-05-26 RX ORDER — ESCITALOPRAM OXALATE 5 MG/1
TABLET ORAL
Qty: 90 TABLET | Refills: 0 | Status: SHIPPED | OUTPATIENT
Start: 2023-05-26

## 2023-05-26 NOTE — TELEPHONE ENCOUNTER
Last VISIT - 11/16/22 Cough    Last CPE - 8/1/22    Last REFILL -     ESCITALOPRAM 5 MG Oral Tab 90 tablet 0 11/29/2022      Last LABS - 7/27/22 tsh, lipid, cmp, cbc    No future appointments. Per PROTOCOL? None    Please Approve or Deny.

## 2023-07-20 ENCOUNTER — LAB ENCOUNTER (OUTPATIENT)
Dept: LAB | Age: 58
End: 2023-07-20
Attending: INTERNAL MEDICINE
Payer: COMMERCIAL

## 2023-07-20 DIAGNOSIS — E78.00 HIGH CHOLESTEROL: ICD-10-CM

## 2023-07-20 LAB
CHOLEST SERPL-MCNC: 261 MG/DL (ref ?–200)
FASTING PATIENT LIPID ANSWER: YES
HDLC SERPL-MCNC: 91 MG/DL (ref 40–59)
LDLC SERPL CALC-MCNC: 154 MG/DL (ref ?–100)
NONHDLC SERPL-MCNC: 170 MG/DL (ref ?–130)
TRIGL SERPL-MCNC: 97 MG/DL (ref 30–149)
VLDLC SERPL CALC-MCNC: 18 MG/DL (ref 0–30)

## 2023-07-20 PROCEDURE — 80061 LIPID PANEL: CPT | Performed by: INTERNAL MEDICINE

## 2023-09-21 ENCOUNTER — TELEPHONE (OUTPATIENT)
Dept: INTERNAL MEDICINE CLINIC | Facility: CLINIC | Age: 58
End: 2023-09-21

## 2023-10-05 ENCOUNTER — PATIENT OUTREACH (OUTPATIENT)
Dept: CASE MANAGEMENT | Age: 58
End: 2023-10-05

## 2023-10-05 NOTE — PROCEDURES
The office order for PCP removal request is Approved and finalized on October 5, 2023.     Thanks,  [facility] [employer]

## 2023-10-19 RX ORDER — ESTRADIOL 0.1 MG/G
1 CREAM VAGINAL
Qty: 42.5 G | Refills: 0 | OUTPATIENT
Start: 2023-10-19

## 2025-01-03 ENCOUNTER — TELEPHONE (OUTPATIENT)
Facility: CLINIC | Age: 60
End: 2025-01-03

## 2025-01-03 NOTE — TELEPHONE ENCOUNTER
Medical records request received from [facility], [phone]. Sent to medical records for processing.

## (undated) DEVICE — DECANTER BAG 9": Brand: MEDLINE INDUSTRIES, INC.

## (undated) DEVICE — SUTURE PROLENE CAPIO GYNE

## (undated) DEVICE — KENDALL SCD EXPRESS SLEEVES, KNEE LENGTH, MEDIUM: Brand: KENDALL SCD

## (undated) DEVICE — SUTURE VICRYL 2-0 CT-1

## (undated) DEVICE — Device

## (undated) DEVICE — SUTURE VICRYL 0 CT-1

## (undated) DEVICE — GYN CDS: Brand: MEDLINE INDUSTRIES, INC.

## (undated) DEVICE — DERMABOND LIQUID ADHESIVE

## (undated) DEVICE — TUBING CYSTO

## (undated) DEVICE — GLOVE SURG TRIUMPH SZ 6-1/2

## (undated) DEVICE — SOL  .9 1000ML BTL

## (undated) DEVICE — SOL H2O IV

## (undated) DEVICE — #10 STERILE BLADE: Brand: POLYMER COATED BLADES

## (undated) DEVICE — TRAY FOLEY 16F IC URINMETER

## (undated) DEVICE — GAMMEX® PI HYBRID SIZE 6.5, STERILE POWDER-FREE SURGICAL GLOVE, POLYISOPRENE AND NEOPRENE BLEND: Brand: GAMMEX

## (undated) DEVICE — SUTURE DEVICE CAPIO GYNE

## (undated) DEVICE — MEDI-VAC NON-CONDUCTIVE SUCTION TUBING: Brand: CARDINAL HEALTH

## (undated) DEVICE — SUTURE VICRYL 0

## (undated) DEVICE — NEEDLE SPINAL 22X3-1/2 BLK

## (undated) DEVICE — RETRACTOR LONE STAR STAYS LG

## (undated) DEVICE — SUTURE VICRYL 1 CT-1

## (undated) DEVICE — STANDARD HYPODERMIC NEEDLE,POLYPROPYLENE HUB: Brand: MONOJECT

## (undated) DEVICE — CAUTERY PENCIL

## (undated) DEVICE — #15 STERILE STAINLESS BLADE: Brand: STERILE STAINLESS BLADES

## (undated) DEVICE — SUTURE VICRYL 1

## (undated) DEVICE — ZIPWIRE GUIDEWIRE .038X150 ANG

## (undated) DEVICE — VIOLET BRAIDED (POLYGLACTIN 910), SYNTHETIC ABSORBABLE SUTURE: Brand: COATED VICRYL

## (undated) DEVICE — SUTURE CHROMIC GUT 0 CT-1

## (undated) DEVICE — COVER,MAYO STAND,STERILE: Brand: MEDLINE

## (undated) DEVICE — REM POLYHESIVE ADULT PATIENT RETURN ELECTRODE: Brand: VALLEYLAB

## (undated) DEVICE — BULB SYRINGE,IRRIGATION WITH PROTECTIVE CAP: Brand: DOVER

## (undated) NOTE — LETTER
[address]  [address] [place] [address]  [address]  [place] [address]  [facility]: [phone]  FAX: [phone]   Consent to Procedure/Sedation    Date: __6/26/2019_____    Time: ___11:05 AM ___    1.  I authorize the performanc ___________________________    Signature of person authorized to consent for patient: Relationship to patient:  ___________________________    ___________________    Witness: ____________________     Date: ______________    Printed: 6/26/2019   11:05 AM

## (undated) NOTE — MR AVS SNAPSHOT
[facility] [address] [facility]  [address] [facility]  [place] [id number] [phone]               Thank you for choosing us for your health care visit with VALERIE Pacheco. We are glad to serve you and happy to provide you with this summary of your visit.   [place] · Over-the-counter decongestants may be used unless a similar medicine was prescribed. Nasal sprays work the fastest. Use one that contains phenylephrine or oxymetazoline. First blow the nose gently. Then use the spray.  Do not use these medicines more [place] [address]. All rights reserved. This information is not intended as a substitute for professional medical care. Always follow your healthcare professional's instructions. Follow Up with Our Office     Return if symptoms worsen or fail to improve. visit,  view other health information, and more. To sign up or find more information, go to [email or web address] and click on the Sign Up Now link in the [employer] box.      Enter your [employer] Activation Code exactly as it appears below along with yo

## (undated) NOTE — Clinical Note
I had the pleasure of seeing Ms. Agatadylan Cuevas in my office today. Please see my attached note.       Andreea Solitario

## (undated) NOTE — Clinical Note
I had the pleasure of seeing Ms. Vamsi Ferrer in my office today. Please see my attached note.       Janeen Galvez

## (undated) NOTE — MR AVS SNAPSHOT
[facility] [address] [phone]               Thank you for choosing us for your health care visit with Breanna Sandoval MD.  We are glad to serve you and happy to provide you with this summar Bring a paper prescription for each of these medications    - guaiFENesin-codeine 100-10 MG/5ML Soln            Follow-up Instructions     Return if symptoms worsen or fail to improve.          Dl     Sign up for [employer], your secure [employer] medical re